# Patient Record
Sex: FEMALE | Race: OTHER | HISPANIC OR LATINO | URBAN - METROPOLITAN AREA
[De-identification: names, ages, dates, MRNs, and addresses within clinical notes are randomized per-mention and may not be internally consistent; named-entity substitution may affect disease eponyms.]

---

## 2021-12-13 ENCOUNTER — INPATIENT (INPATIENT)
Facility: HOSPITAL | Age: 86
LOS: 2 days | Discharge: HOME HEALTH | End: 2021-12-16
Attending: INTERNAL MEDICINE | Admitting: INTERNAL MEDICINE
Payer: MEDICARE

## 2021-12-13 ENCOUNTER — TRANSCRIPTION ENCOUNTER (OUTPATIENT)
Age: 86
End: 2021-12-13

## 2021-12-13 VITALS
OXYGEN SATURATION: 98 % | DIASTOLIC BLOOD PRESSURE: 78 MMHG | SYSTOLIC BLOOD PRESSURE: 112 MMHG | TEMPERATURE: 96 F | HEART RATE: 79 BPM | RESPIRATION RATE: 20 BRPM

## 2021-12-13 DIAGNOSIS — M54.9 DORSALGIA, UNSPECIFIED: ICD-10-CM

## 2021-12-13 LAB
ALBUMIN SERPL ELPH-MCNC: 4.5 G/DL — SIGNIFICANT CHANGE UP (ref 3.3–5)
ALP SERPL-CCNC: 84 U/L — SIGNIFICANT CHANGE UP (ref 40–120)
ALT FLD-CCNC: 10 U/L — SIGNIFICANT CHANGE UP (ref 4–33)
ANION GAP SERPL CALC-SCNC: 15 MMOL/L — HIGH (ref 7–14)
APTT BLD: 28.1 SEC — SIGNIFICANT CHANGE UP (ref 27–36.3)
AST SERPL-CCNC: 14 U/L — SIGNIFICANT CHANGE UP (ref 4–32)
BASOPHILS # BLD AUTO: 0.05 K/UL — SIGNIFICANT CHANGE UP (ref 0–0.2)
BASOPHILS NFR BLD AUTO: 0.6 % — SIGNIFICANT CHANGE UP (ref 0–2)
BILIRUB SERPL-MCNC: 0.4 MG/DL — SIGNIFICANT CHANGE UP (ref 0.2–1.2)
BLOOD GAS VENOUS COMPREHENSIVE RESULT: SIGNIFICANT CHANGE UP
BUN SERPL-MCNC: 20 MG/DL — SIGNIFICANT CHANGE UP (ref 7–23)
CALCIUM SERPL-MCNC: 10 MG/DL — SIGNIFICANT CHANGE UP (ref 8.4–10.5)
CHLORIDE SERPL-SCNC: 97 MMOL/L — LOW (ref 98–107)
CO2 SERPL-SCNC: 26 MMOL/L — SIGNIFICANT CHANGE UP (ref 22–31)
CREAT SERPL-MCNC: 1.14 MG/DL — SIGNIFICANT CHANGE UP (ref 0.5–1.3)
EOSINOPHIL # BLD AUTO: 0.06 K/UL — SIGNIFICANT CHANGE UP (ref 0–0.5)
EOSINOPHIL NFR BLD AUTO: 0.7 % — SIGNIFICANT CHANGE UP (ref 0–6)
GLUCOSE SERPL-MCNC: 132 MG/DL — HIGH (ref 70–99)
HCT VFR BLD CALC: 39.8 % — SIGNIFICANT CHANGE UP (ref 34.5–45)
HGB BLD-MCNC: 12.2 G/DL — SIGNIFICANT CHANGE UP (ref 11.5–15.5)
IANC: 6.69 K/UL — SIGNIFICANT CHANGE UP (ref 1.5–8.5)
IMM GRANULOCYTES NFR BLD AUTO: 0.5 % — SIGNIFICANT CHANGE UP (ref 0–1.5)
INR BLD: 1.14 RATIO — SIGNIFICANT CHANGE UP (ref 0.88–1.16)
LIDOCAIN IGE QN: 11 U/L — SIGNIFICANT CHANGE UP (ref 7–60)
LYMPHOCYTES # BLD AUTO: 1.14 K/UL — SIGNIFICANT CHANGE UP (ref 1–3.3)
LYMPHOCYTES # BLD AUTO: 13 % — SIGNIFICANT CHANGE UP (ref 13–44)
MCHC RBC-ENTMCNC: 29 PG — SIGNIFICANT CHANGE UP (ref 27–34)
MCHC RBC-ENTMCNC: 30.7 GM/DL — LOW (ref 32–36)
MCV RBC AUTO: 94.5 FL — SIGNIFICANT CHANGE UP (ref 80–100)
MONOCYTES # BLD AUTO: 0.76 K/UL — SIGNIFICANT CHANGE UP (ref 0–0.9)
MONOCYTES NFR BLD AUTO: 8.7 % — SIGNIFICANT CHANGE UP (ref 2–14)
NEUTROPHILS # BLD AUTO: 6.69 K/UL — SIGNIFICANT CHANGE UP (ref 1.8–7.4)
NEUTROPHILS NFR BLD AUTO: 76.5 % — SIGNIFICANT CHANGE UP (ref 43–77)
NRBC # BLD: 0 /100 WBCS — SIGNIFICANT CHANGE UP
NRBC # FLD: 0 K/UL — SIGNIFICANT CHANGE UP
PLATELET # BLD AUTO: 282 K/UL — SIGNIFICANT CHANGE UP (ref 150–400)
POTASSIUM SERPL-MCNC: 4.1 MMOL/L — SIGNIFICANT CHANGE UP (ref 3.5–5.3)
POTASSIUM SERPL-SCNC: 4.1 MMOL/L — SIGNIFICANT CHANGE UP (ref 3.5–5.3)
PROT SERPL-MCNC: 7.8 G/DL — SIGNIFICANT CHANGE UP (ref 6–8.3)
PROTHROM AB SERPL-ACNC: 13 SEC — SIGNIFICANT CHANGE UP (ref 10.6–13.6)
RBC # BLD: 4.21 M/UL — SIGNIFICANT CHANGE UP (ref 3.8–5.2)
RBC # FLD: 14.8 % — HIGH (ref 10.3–14.5)
SODIUM SERPL-SCNC: 138 MMOL/L — SIGNIFICANT CHANGE UP (ref 135–145)
WBC # BLD: 8.74 K/UL — SIGNIFICANT CHANGE UP (ref 3.8–10.5)
WBC # FLD AUTO: 8.74 K/UL — SIGNIFICANT CHANGE UP (ref 3.8–10.5)

## 2021-12-13 PROCEDURE — 99223 1ST HOSP IP/OBS HIGH 75: CPT | Mod: GC

## 2021-12-13 PROCEDURE — 74177 CT ABD & PELVIS W/CONTRAST: CPT | Mod: 26,MA

## 2021-12-13 PROCEDURE — 72131 CT LUMBAR SPINE W/O DYE: CPT | Mod: 26,MA

## 2021-12-13 PROCEDURE — 99285 EMERGENCY DEPT VISIT HI MDM: CPT

## 2021-12-13 PROCEDURE — 72128 CT CHEST SPINE W/O DYE: CPT | Mod: 26,MA

## 2021-12-13 PROCEDURE — 73630 X-RAY EXAM OF FOOT: CPT | Mod: 26,RT

## 2021-12-13 PROCEDURE — 70450 CT HEAD/BRAIN W/O DYE: CPT | Mod: 26,MA

## 2021-12-13 RX ORDER — MORPHINE SULFATE 50 MG/1
2 CAPSULE, EXTENDED RELEASE ORAL ONCE
Refills: 0 | Status: DISCONTINUED | OUTPATIENT
Start: 2021-12-13 | End: 2021-12-13

## 2021-12-13 RX ORDER — ACETAMINOPHEN 500 MG
1000 TABLET ORAL ONCE
Refills: 0 | Status: COMPLETED | OUTPATIENT
Start: 2021-12-13 | End: 2021-12-13

## 2021-12-13 RX ORDER — MORPHINE SULFATE 50 MG/1
4 CAPSULE, EXTENDED RELEASE ORAL ONCE
Refills: 0 | Status: DISCONTINUED | OUTPATIENT
Start: 2021-12-13 | End: 2021-12-13

## 2021-12-13 RX ORDER — LIDOCAINE 4 G/100G
1 CREAM TOPICAL ONCE
Refills: 0 | Status: COMPLETED | OUTPATIENT
Start: 2021-12-13 | End: 2021-12-13

## 2021-12-13 RX ORDER — SODIUM CHLORIDE 9 MG/ML
500 INJECTION INTRAMUSCULAR; INTRAVENOUS; SUBCUTANEOUS ONCE
Refills: 0 | Status: COMPLETED | OUTPATIENT
Start: 2021-12-13 | End: 2021-12-13

## 2021-12-13 RX ADMIN — Medication 1000 MILLIGRAM(S): at 17:15

## 2021-12-13 RX ADMIN — SODIUM CHLORIDE 500 MILLILITER(S): 9 INJECTION INTRAMUSCULAR; INTRAVENOUS; SUBCUTANEOUS at 16:47

## 2021-12-13 RX ADMIN — MORPHINE SULFATE 4 MILLIGRAM(S): 50 CAPSULE, EXTENDED RELEASE ORAL at 21:42

## 2021-12-13 RX ADMIN — LIDOCAINE 1 PATCH: 4 CREAM TOPICAL at 21:42

## 2021-12-13 RX ADMIN — Medication 1000 MILLIGRAM(S): at 18:58

## 2021-12-13 RX ADMIN — MORPHINE SULFATE 2 MILLIGRAM(S): 50 CAPSULE, EXTENDED RELEASE ORAL at 16:46

## 2021-12-13 RX ADMIN — Medication 400 MILLIGRAM(S): at 16:47

## 2021-12-13 RX ADMIN — MORPHINE SULFATE 2 MILLIGRAM(S): 50 CAPSULE, EXTENDED RELEASE ORAL at 18:58

## 2021-12-13 RX ADMIN — MORPHINE SULFATE 4 MILLIGRAM(S): 50 CAPSULE, EXTENDED RELEASE ORAL at 18:52

## 2021-12-13 NOTE — H&P ADULT - HISTORY OF PRESENT ILLNESS
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 History obtained primarily from ED documentation as pt has poor mental status in the setting of dementia. Patient started to have back pain early november, [place] [facility] [place]9 fracture on [place]/9, admitted to the hospital, had a kyphoplasty on 11/17 by dr. bryant and was dc to rehab then home. Over last few days pain acutely worsened to mid and lower back, constant, worse with movements, better when sitting up, still severe despite tylenol and oxycodone use. Last tylenol was approx 8am today. Patient took an oxy on saturday night, sunday grandaughter noted her to be dazed and a bit confused, vomited x 3, and has since not taken in much po. Patient and grandaughter deny any fever, cough, ha, neck pain, cp, sob, abd pain, diarrhea, dysuria, new le edema. Patient does note toe pain to r. third and fourth toe-no known trauma. Last fall >6m ago. No focal weakness, numbness, bowel/bladder retention or incontinence.   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 History obtained primarily from ED documentation as pt has poor mental status in the setting of dementia. Patient started to have back pain early november, went to [place] in [facility] [place] and was diagnosed with t9 fracture on 11/9, admitted to the hospital, had a kyphoplasty on 11/17 by dr. bryant and was dc to rehab then home. Over last few days pain acutely worsened to mid and lower back, constant, worse with movements, better when sitting up, still severe despite tylenol and oxycodone use. Last tylenol was approx 8am today. Patient took an oxy on saturday night, sunday grandaughter noted her to be dazed and a bit confused, vomited x 3, and has since not taken in much po. Patient and grandaughter deny any fever, cough, ha, neck pain, cp, sob, abd pain, diarrhea, dysuria, new le edema. Patient does note toe pain to r. third and fourth toe-no known trauma. Last fall >6m ago. No focal weakness, numbness, bowel/bladder retention or incontinence.

## 2021-12-13 NOTE — H&P ADULT - PROBLEM SELECTOR PLAN 1
-pt presenting s/p several weeks of back trauma seen at outside hospital  -pt's imaging not suggestive of acute pathology on CT scan  -pt has hx of neuropathy and DM, thus back pain could be deconditioning vs neuropathic pain   -pt appears to not tolerate oxycodone well (dizziness with vomiting), can attempt to treat pain with tylenol and NSAID multimodal  -PT eval in the AM -pt presenting s/p several weeks of back trauma seen at outside hospital  -pt's imaging not suggestive of acute pathology on CT scan  -pt has hx of neuropathy and DM, thus back pain could be deconditioning vs neuropathic pain   -pt appears to not tolerate oxycodone well (dizziness with vomiting), can attempt to treat pain with Tylenol and NSAID multimodal  -PT eval in the AM  -MRI thoracic and lumbar ordered

## 2021-12-13 NOTE — H&P ADULT - ATTENDING COMMENTS
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 s/p recent T9 kyphoplasty at [facility]. pan spine CT negative for acute process, pt denies numbness, symmetric strength throughout, but will order MRI given worsening pain following recent spine intervention. Trial toradol/ tylenol, PT

## 2021-12-13 NOTE — ED PROVIDER NOTE - CLINICAL SUMMARY MEDICAL DECISION MAKING FREE TEXT BOX
86f with ext medical history, t9 fx dx on 11/9 s/p kyphoplasty on 11/17, now with acute worsening of back pain, episode of confusion and vomiting x 3 yesterday. Patient nontoxic appearing, ao3, vs ok, plan for labs, ua/cx, ct t-l spine, abd pelvis, brain, xr toes,-eval for elec disturbances, organ dysfunction, new fractures, infections, mass/stroke, and reass-patient currently neuro intact, pain control-discussed options with patient-will trial iv tylenol and small dose of morphine given pain severe at this time, and reass. Recommended admission to patient and family pending initial ed w/u.

## 2021-12-13 NOTE — H&P ADULT - NSHPPHYSICALEXAM_GEN_ALL_CORE
Gen: awake, alert, WD, WN, NAD, pt disoriented presumably due to dementia  Head:  NC, AT  ENT:  PERRL, moist mucous membranes, OP-clear  Neck: supple, nontender  CV:  S1 S2, RRR, no M/G/R  Pulm:  CTAB, good air movement  Abd:  Soft, nontender, nondistended, +BS, no rebound/guarding  Back:   to palpation in thorcic and lumbar areas  Ext:  warm, well perfused, moving all extremities spontaneously, 1+ edema in LE, distal pulses intact  Skin: intact  Neuro:  A&Ox2, no focal neuro deficit, speech clear

## 2021-12-13 NOTE — ED PROVIDER NOTE - PROGRESS NOTE DETAILS
patient reassessed after return from ct scan-she still is having severe pain, will redose meds and judiciously increase dose of morphine to 4mg. patient and grandaughter agreeable. all results d/w patient and [facility], admit for further management/pain control.

## 2021-12-13 NOTE — ED ADULT NURSE NOTE - OBJECTIVE STATEMENT
Pt received to intake 13 c/o back pain. Pt had a spinal fracture and had spinal surgery on Nov 17 2021. Pt is Aox4, ambulatory with cane at baseline. Currently Aox4, hard of hearing. Accompanied by granddaughter. Appears uncomfortable when moving in bed. 20g IV placed to L ac. Labs drawn & sent. Medicated for pain. Awaiting UA and imaging. Hx: HTN, HLD, DM, MI stents (unknown how many), osteoporosis. Denies any falls or injury that caused fx. Granddaughter states that MDs believe the fracture may have been caused by osteoporosis.

## 2021-12-13 NOTE — H&P ADULT - ASSESSMENT
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 Imaging is not suggestive of any acute mechanical processes that could directly be the cause of this back pain. Most likely due to deconditioning from previous trauma.  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 Imaging is not suggestive of any acute mechanical processes that could directly be the cause of this back pain. Most likely due to deconditioning from previous trauma and extended bed rest. Will have PT eval the pt.

## 2021-12-13 NOTE — H&P ADULT - PROBLEM SELECTOR PLAN 3
-low dose ISS before meals and at night  -A1C in the AM   -hold at home metformin and SGLT2 inhibitor -low dose ISS before meals and at night  -A1C in the AM   -hold at home metformin and SGLT2 inhibitor  -repeat BMP in the AM to check anion gap

## 2021-12-13 NOTE — ED PROVIDER NOTE - OBJECTIVE STATEMENT
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 History obtained primarily from gemater at bedside with patient supplementing. Patient started to have back pain early november, [place] [facility] [place] diagnosed with t9 fracture on 11/9, admitted to the hospital, had a kyphoplasty on 11/17 by dr. bryant and was dc to rehab then home. Over last few days pain acutely worsened to mid and lower back, constant, worse with movements, better when sitting up, still severe despite tylenol and oxycodone use. Last tylenol was approx 8am today. Patient took an oxy on saturday night, sunday grandaughter noted her to be dazed and a bit confused, vomited x 3, and has since not taken in much po. Patient and grandaughter deny any fever, cough, ha, neck pain, cp, sob, abd pain, diarrhea, dysuria, new le edema. Patient does note toe pain to r. third and fourth toe-no known trauma. Last fall >6m ago. No focal weakness, numbness, bowel/bladder retention or incontinence.

## 2021-12-13 NOTE — ED PROVIDER NOTE - NS ED ROS FT
ROS:  GENERAL: No fever, no chills, +dec appetite  EYES: no change in vision  HEENT: no trouble swallowing, no trouble speaking  CARDIAC: no chest pain  PULMONARY: no cough, no shortness of breath  GI: no abdominal pain, + vomiting now resolved, no diarrhea, no constipation  : No dysuria, no frequency, no change in appearance, or odor of urine  SKIN: no rashes  NEURO: no headache, no weakness  MSK: +back pain, toe pain

## 2021-12-13 NOTE — ED ADULT TRIAGE NOTE - CHIEF COMPLAINT QUOTE
Pt brought in by family member for back pain. Pt has known fracture of spine. Pt has been having physical therapy but pain is worsening.

## 2021-12-13 NOTE — H&P ADULT - PROBLEM SELECTOR PLAN 7
-cont gabapentin with increased dose of 200-300 TID to see if this improves back pain as well -cont gabapentin at home dose

## 2021-12-13 NOTE — H&P ADULT - NSHPLABSRESULTS_GEN_ALL_CORE
.  LABS:                         12.2   8.74  )-----------( 282      ( 13 Dec 2021 16:50 )             39.8     12-13    138  |  97<L>  |  20  ----------------------------<  132<H>  4.1   |  26  |  1.14    Ca    10.0      13 Dec 2021 16:50    TPro  7.8  /  Alb  4.5  /  TBili  0.4  /  DBili  x   /  AST  14  /  ALT  10  /  AlkPhos  84  12-13    PT/INR - ( 13 Dec 2021 16:50 )   PT: 13.0 sec;   INR: 1.14 ratio         PTT - ( 13 Dec 2021 16:50 )  PTT:28.1 sec  Urinalysis Basic - ( 13 Dec 2021 22:25 )    Color: Light Yellow / Appearance: Clear / SG: >1.050 / pH: x  Gluc: x / Ketone: Trace  / Bili: Negative / Urobili: <2 mg/dL   Blood: x / Protein: Trace / Nitrite: Negative   Leuk Esterase: Large / RBC: 1 /HPF / WBC 13 /HPF   Sq Epi: x / Non Sq Epi: 5 /HPF / Bacteria: Few            RADIOLOGY, EKG & ADDITIONAL TESTS: Reviewed. .  LABS:                         12.2   8.74  )-----------( 282      ( 13 Dec 2021 16:50 )             39.8     12-13    138  |  97<L>  |  20  ----------------------------<  132<H>  4.1   |  26  |  1.14    Ca    10.0      13 Dec 2021 16:50    TPro  7.8  /  Alb  4.5  /  TBili  0.4  /  DBili  x   /  AST  14  /  ALT  10  /  AlkPhos  84  12-13    PT/INR - ( 13 Dec 2021 16:50 )   PT: 13.0 sec;   INR: 1.14 ratio         PTT - ( 13 Dec 2021 16:50 )  PTT:28.1 sec  Urinalysis Basic - ( 13 Dec 2021 22:25 )    < from: CT Lumbar Spine No Cont (12.13.21 @ 18:45) >    IMPRESSION:    No acute fracture or traumatic subluxation.    Methylmethacrylate is noted within the T9 vertebral body. T9 vertebral   body is moderately compressed. No retropulsed bone.    Degenerative changes in the lumbar region as detailed in the body the   report      --- Endof Report ---    < end of copied text >        Color: Light Yellow / Appearance: Clear / SG: >1.050 / pH: x  Gluc: x / Ketone: Trace  / Bili: Negative / Urobili: <2 mg/dL   Blood: x / Protein: Trace / Nitrite: Negative   Leuk Esterase: Large / RBC: 1 /HPF / WBC 13 /HPF   Sq Epi: x / Non Sq Epi: 5 /HPF / Bacteria: Few            RADIOLOGY, EKG & ADDITIONAL TESTS: Reviewed.

## 2021-12-13 NOTE — H&P ADULT - PROBLEM/PLAN-8
Consent: Written consent was obtained and risks were reviewed including but not limited to scarring, infection, bleeding, scabbing, incomplete removal, nerve damage and allergy to anesthesia. Dressing: bandage Hemostasis: Drysol Lab: [id number] [facility] [address] Depth Of Biopsy: dermis Size Of Lesion In Cm: 0.8 Anesthesia Volume In Cc (Will Not Render If 0): 1 Wound Care: Polysporin ointment Anesthesia Type: 1% lidocaine with epinephrine and a 1:10 solution of 8.4% sodium bicarbonate Bill 11986 For Specimen Handling/Conveyance To Laboratory?: no Notification Instructions: Patient will be notified of biopsy results. However, patient instructed to call the office if not contacted within 2 weeks. Was A Bandage Applied: Yes Electrodesiccation Text: The wound bed was treated with electrodesiccation after the biopsy was performed. Curettage Text: The wound bed was treated with curettage after the biopsy was performed. Cryotherapy Text: The wound bed was treated with cryotherapy after the biopsy was performed. Type Of Destruction Used: Curettage X Size Of Lesion In Cm: 0 Silver Nitrate Text: The wound bed was treated with silver nitrate after the biopsy was performed. Billing Type: [employer] Detail Level: Detailed Post-Care Instructions: I reviewed with the patient in detail post-care instructions. Patient is to keep the biopsy site dry overnight, and then apply bacitracin twice daily until healed. Patient may apply hydrogen peroxide soaks to remove any crusting. Electrodesiccation And Curettage Text: The wound bed was treated with electrodesiccation and curettage after the biopsy was performed. Biopsy Type: H and E Body Location Override (Optional - Billing Will Still Be Based On Selected Body Map Location If Applicable): Right Nasal Crease Biopsy Method: Personna blade DISPLAY PLAN FREE TEXT

## 2021-12-14 ENCOUNTER — TRANSCRIPTION ENCOUNTER (OUTPATIENT)
Age: 86
End: 2021-12-14

## 2021-12-14 DIAGNOSIS — I25.10 ATHEROSCLEROTIC HEART DISEASE OF NATIVE CORONARY ARTERY WITHOUT ANGINA PECTORIS: ICD-10-CM

## 2021-12-14 DIAGNOSIS — M06.9 RHEUMATOID ARTHRITIS, UNSPECIFIED: ICD-10-CM

## 2021-12-14 DIAGNOSIS — E03.9 HYPOTHYROIDISM, UNSPECIFIED: ICD-10-CM

## 2021-12-14 DIAGNOSIS — M19.90 UNSPECIFIED OSTEOARTHRITIS, UNSPECIFIED SITE: ICD-10-CM

## 2021-12-14 DIAGNOSIS — Z29.9 ENCOUNTER FOR PROPHYLACTIC MEASURES, UNSPECIFIED: ICD-10-CM

## 2021-12-14 DIAGNOSIS — I10 ESSENTIAL (PRIMARY) HYPERTENSION: ICD-10-CM

## 2021-12-14 DIAGNOSIS — E11.9 TYPE 2 DIABETES MELLITUS WITHOUT COMPLICATIONS: ICD-10-CM

## 2021-12-14 DIAGNOSIS — G62.9 POLYNEUROPATHY, UNSPECIFIED: ICD-10-CM

## 2021-12-14 DIAGNOSIS — M54.9 DORSALGIA, UNSPECIFIED: ICD-10-CM

## 2021-12-14 LAB
A1C WITH ESTIMATED AVERAGE GLUCOSE RESULT: 7.8 % — HIGH (ref 4–5.6)
ESTIMATED AVERAGE GLUCOSE: 177 — SIGNIFICANT CHANGE UP
GLUCOSE BLDC GLUCOMTR-MCNC: 147 MG/DL — HIGH (ref 70–99)
GLUCOSE BLDC GLUCOMTR-MCNC: 221 MG/DL — HIGH (ref 70–99)
GLUCOSE BLDC GLUCOMTR-MCNC: 317 MG/DL — HIGH (ref 70–99)

## 2021-12-14 PROCEDURE — 99232 SBSQ HOSP IP/OBS MODERATE 35: CPT | Mod: GC

## 2021-12-14 PROCEDURE — 93970 EXTREMITY STUDY: CPT | Mod: 26

## 2021-12-14 RX ORDER — KETOROLAC TROMETHAMINE 30 MG/ML
15 SYRINGE (ML) INJECTION ONCE
Refills: 0 | Status: DISCONTINUED | OUTPATIENT
Start: 2021-12-14 | End: 2021-12-14

## 2021-12-14 RX ORDER — GLUCAGON INJECTION, SOLUTION 0.5 MG/.1ML
1 INJECTION, SOLUTION SUBCUTANEOUS ONCE
Refills: 0 | Status: DISCONTINUED | OUTPATIENT
Start: 2021-12-14 | End: 2021-12-16

## 2021-12-14 RX ORDER — LOSARTAN POTASSIUM 100 MG/1
1 TABLET, FILM COATED ORAL
Qty: 0 | Refills: 0 | DISCHARGE

## 2021-12-14 RX ORDER — DEXTROSE 50 % IN WATER 50 %
25 SYRINGE (ML) INTRAVENOUS ONCE
Refills: 0 | Status: DISCONTINUED | OUTPATIENT
Start: 2021-12-14 | End: 2021-12-16

## 2021-12-14 RX ORDER — FUROSEMIDE 40 MG
1 TABLET ORAL
Qty: 0 | Refills: 0 | DISCHARGE

## 2021-12-14 RX ORDER — ACETAMINOPHEN 500 MG
650 TABLET ORAL EVERY 6 HOURS
Refills: 0 | Status: DISCONTINUED | OUTPATIENT
Start: 2021-12-14 | End: 2021-12-14

## 2021-12-14 RX ORDER — DEXTROSE 50 % IN WATER 50 %
15 SYRINGE (ML) INTRAVENOUS ONCE
Refills: 0 | Status: DISCONTINUED | OUTPATIENT
Start: 2021-12-14 | End: 2021-12-16

## 2021-12-14 RX ORDER — SODIUM CHLORIDE 9 MG/ML
1000 INJECTION, SOLUTION INTRAVENOUS
Refills: 0 | Status: DISCONTINUED | OUTPATIENT
Start: 2021-12-14 | End: 2021-12-16

## 2021-12-14 RX ORDER — DEXTROSE 50 % IN WATER 50 %
12.5 SYRINGE (ML) INTRAVENOUS ONCE
Refills: 0 | Status: DISCONTINUED | OUTPATIENT
Start: 2021-12-14 | End: 2021-12-16

## 2021-12-14 RX ORDER — METOPROLOL TARTRATE 50 MG
12.5 TABLET ORAL
Qty: 0 | Refills: 0 | DISCHARGE

## 2021-12-14 RX ORDER — CLOPIDOGREL BISULFATE 75 MG/1
1 TABLET, FILM COATED ORAL
Qty: 0 | Refills: 0 | DISCHARGE

## 2021-12-14 RX ORDER — LANOLIN ALCOHOL/MO/W.PET/CERES
3 CREAM (GRAM) TOPICAL AT BEDTIME
Refills: 0 | Status: DISCONTINUED | OUTPATIENT
Start: 2021-12-14 | End: 2021-12-16

## 2021-12-14 RX ORDER — CLOPIDOGREL BISULFATE 75 MG/1
75 TABLET, FILM COATED ORAL DAILY
Refills: 0 | Status: DISCONTINUED | OUTPATIENT
Start: 2021-12-14 | End: 2021-12-16

## 2021-12-14 RX ORDER — FUROSEMIDE 40 MG
40 TABLET ORAL DAILY
Refills: 0 | Status: DISCONTINUED | OUTPATIENT
Start: 2021-12-14 | End: 2021-12-14

## 2021-12-14 RX ORDER — RIVAROXABAN 15 MG-20MG
20 KIT ORAL DAILY
Refills: 0 | Status: DISCONTINUED | OUTPATIENT
Start: 2021-12-14 | End: 2021-12-16

## 2021-12-14 RX ORDER — OXYCODONE HYDROCHLORIDE 5 MG/1
5 TABLET ORAL
Refills: 0 | Status: DISCONTINUED | OUTPATIENT
Start: 2021-12-14 | End: 2021-12-16

## 2021-12-14 RX ORDER — ONDANSETRON 8 MG/1
4 TABLET, FILM COATED ORAL EVERY 8 HOURS
Refills: 0 | Status: DISCONTINUED | OUTPATIENT
Start: 2021-12-14 | End: 2021-12-16

## 2021-12-14 RX ORDER — OXYCODONE HYDROCHLORIDE 5 MG/1
5 TABLET ORAL ONCE
Refills: 0 | Status: DISCONTINUED | OUTPATIENT
Start: 2021-12-14 | End: 2021-12-14

## 2021-12-14 RX ORDER — FOLIC ACID 0.8 MG
1 TABLET ORAL
Qty: 0 | Refills: 0 | DISCHARGE

## 2021-12-14 RX ORDER — INSULIN LISPRO 100/ML
VIAL (ML) SUBCUTANEOUS AT BEDTIME
Refills: 0 | Status: DISCONTINUED | OUTPATIENT
Start: 2021-12-14 | End: 2021-12-16

## 2021-12-14 RX ORDER — CHOLECALCIFEROL (VITAMIN D3) 125 MCG
1 CAPSULE ORAL
Qty: 0 | Refills: 0 | DISCHARGE

## 2021-12-14 RX ORDER — INSULIN LISPRO 100/ML
VIAL (ML) SUBCUTANEOUS
Refills: 0 | Status: DISCONTINUED | OUTPATIENT
Start: 2021-12-14 | End: 2021-12-16

## 2021-12-14 RX ORDER — ACETAMINOPHEN 500 MG
650 TABLET ORAL EVERY 6 HOURS
Refills: 0 | Status: DISCONTINUED | OUTPATIENT
Start: 2021-12-14 | End: 2021-12-16

## 2021-12-14 RX ORDER — LEVOTHYROXINE SODIUM 125 MCG
75 TABLET ORAL DAILY
Refills: 0 | Status: DISCONTINUED | OUTPATIENT
Start: 2021-12-14 | End: 2021-12-16

## 2021-12-14 RX ORDER — DAPAGLIFLOZIN 10 MG/1
1 TABLET, FILM COATED ORAL
Qty: 0 | Refills: 0 | DISCHARGE

## 2021-12-14 RX ORDER — ATORVASTATIN CALCIUM 80 MG/1
40 TABLET, FILM COATED ORAL AT BEDTIME
Refills: 0 | Status: DISCONTINUED | OUTPATIENT
Start: 2021-12-14 | End: 2021-12-16

## 2021-12-14 RX ORDER — METFORMIN HYDROCHLORIDE 850 MG/1
1 TABLET ORAL
Qty: 0 | Refills: 0 | DISCHARGE

## 2021-12-14 RX ORDER — LOSARTAN POTASSIUM 100 MG/1
100 TABLET, FILM COATED ORAL DAILY
Refills: 0 | Status: DISCONTINUED | OUTPATIENT
Start: 2021-12-14 | End: 2021-12-16

## 2021-12-14 RX ORDER — LEVOTHYROXINE SODIUM 125 MCG
1 TABLET ORAL
Qty: 0 | Refills: 0 | DISCHARGE

## 2021-12-14 RX ORDER — ATORVASTATIN CALCIUM 80 MG/1
1 TABLET, FILM COATED ORAL
Qty: 0 | Refills: 0 | DISCHARGE

## 2021-12-14 RX ORDER — GABAPENTIN 400 MG/1
100 CAPSULE ORAL THREE TIMES A DAY
Refills: 0 | Status: DISCONTINUED | OUTPATIENT
Start: 2021-12-14 | End: 2021-12-16

## 2021-12-14 RX ORDER — DIPHENHYDRAMINE HCL 50 MG
25 CAPSULE ORAL ONCE
Refills: 0 | Status: COMPLETED | OUTPATIENT
Start: 2021-12-14 | End: 2021-12-14

## 2021-12-14 RX ORDER — METOPROLOL TARTRATE 50 MG
12.5 TABLET ORAL DAILY
Refills: 0 | Status: DISCONTINUED | OUTPATIENT
Start: 2021-12-14 | End: 2021-12-16

## 2021-12-14 RX ORDER — RIVAROXABAN 15 MG-20MG
1 KIT ORAL
Qty: 0 | Refills: 0 | DISCHARGE

## 2021-12-14 RX ORDER — INSULIN LISPRO 100/ML
4 VIAL (ML) SUBCUTANEOUS
Qty: 0 | Refills: 0 | DISCHARGE

## 2021-12-14 RX ORDER — GABAPENTIN 400 MG/1
1 CAPSULE ORAL
Qty: 0 | Refills: 0 | DISCHARGE

## 2021-12-14 RX ORDER — METHOTREXATE 2.5 MG/1
1 TABLET ORAL
Qty: 0 | Refills: 0 | DISCHARGE

## 2021-12-14 RX ORDER — LIDOCAINE 4 G/100G
1 CREAM TOPICAL DAILY
Refills: 0 | Status: DISCONTINUED | OUTPATIENT
Start: 2021-12-14 | End: 2021-12-16

## 2021-12-14 RX ORDER — PANTOPRAZOLE SODIUM 20 MG/1
40 TABLET, DELAYED RELEASE ORAL
Refills: 0 | Status: DISCONTINUED | OUTPATIENT
Start: 2021-12-14 | End: 2021-12-16

## 2021-12-14 RX ORDER — PANTOPRAZOLE SODIUM 20 MG/1
1 TABLET, DELAYED RELEASE ORAL
Qty: 0 | Refills: 0 | DISCHARGE

## 2021-12-14 RX ADMIN — OXYCODONE HYDROCHLORIDE 5 MILLIGRAM(S): 5 TABLET ORAL at 17:25

## 2021-12-14 RX ADMIN — LIDOCAINE 1 PATCH: 4 CREAM TOPICAL at 06:34

## 2021-12-14 RX ADMIN — GABAPENTIN 100 MILLIGRAM(S): 400 CAPSULE ORAL at 05:32

## 2021-12-14 RX ADMIN — Medication 25 MILLIGRAM(S): at 00:06

## 2021-12-14 RX ADMIN — CLOPIDOGREL BISULFATE 75 MILLIGRAM(S): 75 TABLET, FILM COATED ORAL at 11:23

## 2021-12-14 RX ADMIN — LIDOCAINE 1 PATCH: 4 CREAM TOPICAL at 09:22

## 2021-12-14 RX ADMIN — GABAPENTIN 100 MILLIGRAM(S): 400 CAPSULE ORAL at 21:36

## 2021-12-14 RX ADMIN — ATORVASTATIN CALCIUM 40 MILLIGRAM(S): 80 TABLET, FILM COATED ORAL at 21:36

## 2021-12-14 RX ADMIN — GABAPENTIN 100 MILLIGRAM(S): 400 CAPSULE ORAL at 13:08

## 2021-12-14 RX ADMIN — RIVAROXABAN 20 MILLIGRAM(S): KIT at 14:35

## 2021-12-14 RX ADMIN — LOSARTAN POTASSIUM 100 MILLIGRAM(S): 100 TABLET, FILM COATED ORAL at 05:32

## 2021-12-14 RX ADMIN — Medication 12.5 MILLIGRAM(S): at 05:31

## 2021-12-14 RX ADMIN — Medication 40 MILLIGRAM(S): at 05:34

## 2021-12-14 RX ADMIN — PANTOPRAZOLE SODIUM 40 MILLIGRAM(S): 20 TABLET, DELAYED RELEASE ORAL at 06:03

## 2021-12-14 RX ADMIN — Medication 75 MICROGRAM(S): at 05:31

## 2021-12-14 RX ADMIN — Medication 15 MILLIGRAM(S): at 06:20

## 2021-12-14 RX ADMIN — Medication 5 MILLIGRAM(S): at 05:32

## 2021-12-14 RX ADMIN — Medication 15 MILLIGRAM(S): at 06:17

## 2021-12-14 RX ADMIN — OXYCODONE HYDROCHLORIDE 5 MILLIGRAM(S): 5 TABLET ORAL at 16:55

## 2021-12-14 RX ADMIN — Medication 4: at 17:46

## 2021-12-14 RX ADMIN — LIDOCAINE 1 PATCH: 4 CREAM TOPICAL at 15:38

## 2021-12-14 RX ADMIN — LIDOCAINE 1 PATCH: 4 CREAM TOPICAL at 18:22

## 2021-12-14 RX ADMIN — Medication 650 MILLIGRAM(S): at 17:50

## 2021-12-14 NOTE — DISCHARGE NOTE NURSING/CASE MANAGEMENT/SOCIAL WORK - NSDCCRNAME_GEN_ALL_CORE_FT
[facility] [phone]   HA 12/7 from [facility] [phone]

## 2021-12-14 NOTE — PHYSICAL THERAPY INITIAL EVALUATION ADULT - DISCHARGE DISPOSITION, PT EVAL
pt may benefit from outpatient PT and rolling walker (pt states she owns walker); if unable to get transportation to outpatient services may benefit from home with home PT

## 2021-12-14 NOTE — PHYSICAL THERAPY INITIAL EVALUATION ADULT - PERTINENT HX OF CURRENT PROBLEM, REHAB EVAL
87 y/o female with h/o HTN, DM, HLD, RA, OA, hypothyroidism, neuropathy, left LE dvt on xarelto, presented to the ED with back pain. s/p recent T9 kyphoplasty at [facility]. CT negative for acute process

## 2021-12-14 NOTE — PHYSICAL THERAPY INITIAL EVALUATION ADULT - LEVEL OF INDEPENDENCE: GAIT, REHAB EVAL
contact guard/occasional minimal assist while pt using straight cane;  close supervision/contact guard with rolling walker

## 2021-12-14 NOTE — PATIENT PROFILE ADULT - FALL HARM RISK - HARM RISK INTERVENTIONS

## 2021-12-14 NOTE — PROGRESS NOTE ADULT - PROBLEM SELECTOR PLAN 5
-nsaid therapy as above Patient on chronic prednisone 5mg and methotrexate once a week (on Wednesday)  - Continue home meds

## 2021-12-14 NOTE — PROGRESS NOTE ADULT - SUBJECTIVE AND OBJECTIVE BOX
PROGRESS NOTE:   Authored by Jairo Villalobos MD  Pager: [facility] [phone]; [facility] [phone]    Patient is a 86y old  Female who presents with a chief complaint of Back pain (13 Dec 2021 21:15)      SUBJECTIVE / OVERNIGHT EVENTS:    ADDITIONAL REVIEW OF SYSTEMS:    MEDICATIONS  (STANDING):  clopidogrel Tablet 75 milliGRAM(s) Oral daily  furosemide    Tablet 40 milliGRAM(s) Oral daily  gabapentin 100 milliGRAM(s) Oral three times a day  levothyroxine 75 MICROGram(s) Oral daily  losartan 100 milliGRAM(s) Oral daily  metoprolol tartrate 12.5 milliGRAM(s) Oral daily  pantoprazole    Tablet 40 milliGRAM(s) Oral before breakfast  predniSONE   Tablet 5 milliGRAM(s) Oral daily  rivaroxaban 20 milliGRAM(s) Oral daily    MEDICATIONS  (PRN):  acetaminophen     Tablet .. 650 milliGRAM(s) Oral every 6 hours PRN Temp greater or equal to 38C (100.4F), Mild Pain (1 - 3)  aluminum hydroxide/magnesium hydroxide/simethicone Suspension 30 milliLiter(s) Oral every 4 hours PRN Dyspepsia  melatonin 3 milliGRAM(s) Oral at bedtime PRN Insomnia  ondansetron Injectable 4 milliGRAM(s) IV Push every 8 hours PRN Nausea and/or Vomiting      CAPILLARY BLOOD GLUCOSE      POCT Blood Glucose.: 96 mg/dL (14 Dec 2021 04:29)  POCT Blood Glucose.: 120 mg/dL (13 Dec 2021 16:58)    I&O's Summary      PHYSICAL EXAM:  Vital Signs Last 24 Hrs  T(C): 36.3 (14 Dec 2021 05:20), Max: 37.1 (13 Dec 2021 16:30)  T(F): 97.4 (14 Dec 2021 05:20), Max: 98.7 (13 Dec 2021 16:30)  HR: 65 (14 Dec 2021 05:20) (65 - 80)  BP: 169/77 (14 Dec 2021 05:20) (112/78 - 172/74)  BP(mean): --  RR: 18 (14 Dec 2021 05:20) (16 - 20)  SpO2: 99% (14 Dec 2021 05:20) (96% - 100%)    GENERAL: No acute distress, well-developed  HEAD:  Atraumatic, Normocephalic  EYES: EOMI, PERRLA, conjunctiva and sclera clear  NECK: Supple, no lymphadenopathy, no JVD  CHEST/LUNG: CTAB; No wheezes, rales, or rhonchi  HEART: Regular rate and rhythm; No murmurs, rubs, or gallops  ABDOMEN: Soft, non-tender, non-distended; normal bowel sounds, no organomegaly  EXTREMITIES:  2+ peripheral pulses b/l, No clubbing, cyanosis, or edema  NEUROLOGY: A&O x 3, no focal deficits  SKIN: No rashes or lesions    LABS:                        12.2   8.74  )-----------( 282      ( 13 Dec 2021 16:50 )             39.8     12-13    138  |  97<L>  |  20  ----------------------------<  132<H>  4.1   |  26  |  1.14    Ca    10.0      13 Dec 2021 16:50    TPro  7.8  /  Alb  4.5  /  TBili  0.4  /  DBili  x   /  AST  14  /  ALT  10  /  AlkPhos  84  12-13    PT/INR - ( 13 Dec 2021 16:50 )   PT: 13.0 sec;   INR: 1.14 ratio         PTT - ( 13 Dec 2021 16:50 )  PTT:28.1 sec      Urinalysis Basic - ( 13 Dec 2021 22:25 )    Color: Light Yellow / Appearance: Clear / SG: >1.050 / pH: x  Gluc: x / Ketone: Trace  / Bili: Negative / Urobili: <2 mg/dL   Blood: x / Protein: Trace / Nitrite: Negative   Leuk Esterase: Large / RBC: 1 /HPF / WBC 13 /HPF   Sq Epi: x / Non Sq Epi: 5 /HPF / Bacteria: Few          RADIOLOGY & ADDITIONAL TESTS:  Results Reviewed:   Imaging Personally Reviewed:  Electrocardiogram Personally Reviewed:    COORDINATION OF CARE:  Care Discussed with Consultants/Other Providers [Y/N]:  Prior or Outpatient Records Reviewed [Y/N]:   PROGRESS NOTE:   Authored by Jairo Villalobos MD  Pager: [facility] [phone]; [facility] [phone]    Patient is a 86y old  Female who presents with a chief complaint of Back pain (13 Dec 2021 21:15)      SUBJECTIVE / OVERNIGHT EVENTS:  No acute events overnight. This morning, she reports continued back pain. Denies numbness, weakness, urinary/fecal incontinence.  Otherwise, denies fevers, chills, chest pain, palpitations, N/V/D, new urinary symptoms.    ADDITIONAL REVIEW OF SYSTEMS:  CONSTITUTIONAL: No weakness, fevers or chills  RESPIRATORY: No cough, wheezing, hemoptysis; No shortness of breath  CARDIOVASCULAR: No chest pain or palpitations  GASTROINTESTINAL: No abdominal or epigastric pain. No nausea, vomiting, or hematemesis; No diarrhea or constipation. No melena or hematochezia.  GENITOURINARY: No dysuria, frequency or hematuria  NEUROLOGICAL: No numbness or weakness  All other review of systems is negative unless indicated above.    MEDICATIONS  (STANDING):  clopidogrel Tablet 75 milliGRAM(s) Oral daily  furosemide    Tablet 40 milliGRAM(s) Oral daily  gabapentin 100 milliGRAM(s) Oral three times a day  levothyroxine 75 MICROGram(s) Oral daily  losartan 100 milliGRAM(s) Oral daily  metoprolol tartrate 12.5 milliGRAM(s) Oral daily  pantoprazole    Tablet 40 milliGRAM(s) Oral before breakfast  predniSONE   Tablet 5 milliGRAM(s) Oral daily  rivaroxaban 20 milliGRAM(s) Oral daily    MEDICATIONS  (PRN):  acetaminophen     Tablet .. 650 milliGRAM(s) Oral every 6 hours PRN Temp greater or equal to 38C (100.4F), Mild Pain (1 - 3)  aluminum hydroxide/magnesium hydroxide/simethicone Suspension 30 milliLiter(s) Oral every 4 hours PRN Dyspepsia  melatonin 3 milliGRAM(s) Oral at bedtime PRN Insomnia  ondansetron Injectable 4 milliGRAM(s) IV Push every 8 hours PRN Nausea and/or Vomiting      CAPILLARY BLOOD GLUCOSE      POCT Blood Glucose.: 96 mg/dL (14 Dec 2021 04:29)  POCT Blood Glucose.: 120 mg/dL (13 Dec 2021 16:58)    I&O's Summary      PHYSICAL EXAM:  Vital Signs Last 24 Hrs  T(C): 36.3 (14 Dec 2021 05:20), Max: 37.1 (13 Dec 2021 16:30)  T(F): 97.4 (14 Dec 2021 05:20), Max: 98.7 (13 Dec 2021 16:30)  HR: 65 (14 Dec 2021 05:20) (65 - 80)  BP: 169/77 (14 Dec 2021 05:20) (112/78 - 172/74)  BP(mean): --  RR: 18 (14 Dec 2021 05:20) (16 - 20)  SpO2: 99% (14 Dec 2021 05:20) (96% - 100%)    GENERAL: No acute distress, well-developed  HEAD:  Atraumatic, Normocephalic  EYES: EOMI, PERRLA, conjunctiva and sclera clear  CHEST/LUNG: CTAB; No wheezes, rales, or rhonchi  HEART: Regular rate and rhythm; No murmurs, rubs, or gallops  ABDOMEN: Soft, non-tender, non-distended; normal bowel sounds  EXTREMITIES:  2+ peripheral pulses b/l, No clubbing, cyanosis, or edema  NEUROLOGY: A&O x 3, no focal deficits    LABS:                        12.2   8.74  )-----------( 282      ( 13 Dec 2021 16:50 )             39.8     12-13    138  |  97<L>  |  20  ----------------------------<  132<H>  4.1   |  26  |  1.14    Ca    10.0      13 Dec 2021 16:50    TPro  7.8  /  Alb  4.5  /  TBili  0.4  /  DBili  x   /  AST  14  /  ALT  10  /  AlkPhos  84  12-13    PT/INR - ( 13 Dec 2021 16:50 )   PT: 13.0 sec;   INR: 1.14 ratio         PTT - ( 13 Dec 2021 16:50 )  PTT:28.1 sec      Urinalysis Basic - ( 13 Dec 2021 22:25 )    Color: Light Yellow / Appearance: Clear / SG: >1.050 / pH: x  Gluc: x / Ketone: Trace  / Bili: Negative / Urobili: <2 mg/dL   Blood: x / Protein: Trace / Nitrite: Negative   Leuk Esterase: Large / RBC: 1 /HPF / WBC 13 /HPF   Sq Epi: x / Non Sq Epi: 5 /HPF / Bacteria: Few          RADIOLOGY & ADDITIONAL TESTS:    EXAM:  XR FOOT COMP MIN 3 VIEWS RT                        PROCEDURE DATE:  12/13/2021      IMPRESSION:  No acute fracture or dislocation.    EXAM:  CT BRAIN                        PROCEDURE DATE:  12/13/2021      IMPRESSION:  No hydrocephalus, acute intracranial hemorrhage, mass effect, or brain   edema.    EXAM:  CT ABDOMEN AND PELVIS IC                        PROCEDURE DATE:  12/13/2021      IMPRESSION:  No acute findings in the abdomen and pelvis.    EXAM:  CT LUMBAR SPINE                        EXAM:  CT THORACIC SPINE                        PROCEDURE DATE:  12/13/2021      IMPRESSION:  No acute fracture or traumatic subluxation.  Methylmethacrylate is noted within the T9 vertebral body. T9 vertebral   body is moderately compressed. No retropulsed bone.  Degenerative changes in the lumbar region as detailed in the body the   report    EXAM:  US DPLX LWR EXT VEINS COMPL BI                        PROCEDURE DATE:  12/14/2021      IMPRESSION:  No evidence of deep venous thrombosis in either lower extremity.

## 2021-12-14 NOTE — PROGRESS NOTE ADULT - ASSESSMENT
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 Imaging is not suggestive of any acute mechanical processes that could directly be the cause of this back pain. Most likely due to deconditioning from previous trauma and extended bed rest. Will have PT eval the pt.  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 Imaging is not suggestive of any acute mechanical processes that could directly be the cause of this back pain. Most likely due to deconditioning from previous trauma and extended bed rest. Will have PT eval the pt.

## 2021-12-14 NOTE — PROGRESS NOTE ADULT - PROBLEM SELECTOR PLAN 4
-pt on chronic prednisone 5mg and methotrexate once a week (on wednesday)  -cont these medications - Continue home Plavix  - Continue home losartan, metoprolol  - Continue home statin

## 2021-12-14 NOTE — DISCHARGE NOTE NURSING/CASE MANAGEMENT/SOCIAL WORK - PATIENT PORTAL LINK FT
You can access the FollowMyHealth Patient Portal offered by [facility] by registering at the following website: [email or web address]. By joining [employer]’s FollowMyHealth portal, you will also be able to view your health information using other applications (apps) compatible with our system.

## 2021-12-14 NOTE — DISCHARGE NOTE NURSING/CASE MANAGEMENT/SOCIAL WORK - NSDCCRNUMBER_GEN_ALL_CORE_FT
Transporation : Ambulance [facility] [phone] trip # [phone] 5 pm

## 2021-12-14 NOTE — DISCHARGE NOTE NURSING/CASE MANAGEMENT/SOCIAL WORK - NSDCPEFALRISK_GEN_ALL_CORE
For information on Fall & Injury Prevention, visit: [email or web address] OR  [email or web address] OR  https://www.cdc.gov/steadi/patient.html

## 2021-12-14 NOTE — CHART NOTE - NSCHARTNOTEFT_GEN_A_CORE
Search Terms: Camilla Nicole, 1935Search Date: 12/14/2021 16:42:04 PM  Searching on behalf of: [facility]  The Drug Utilization Report below displays all of the controlled substance prescriptions, if any, that your patient has filled in the last twelve months. The information displayed on this report is compiled from pharmacy submissions to the Department, and accurately reflects the information as submitted by the pharmacies.    This report was requested by: Jairo Villalobos | Reference #: [id number]    Others' Prescriptions  Patient Name: Camilla NicoleBirth Date: 1935  Address: [address] [place] [id number]: Female  Rx Written	Rx Dispensed	Drug	Quantity	Days Supply	Prescriber Name	Prescriber Cindy #	Payment Method	Dispenser  10/28/2021	10/29/2021	oxycodone hcl 10 mg tablet	30	30	Ester Molina MD	[id number]	[facility] #[id number]  01/29/2021	01/30/2021	oxycodone hcl 5 mg tablet	30	30	Jeanette Shi	[id number]	[facility] #[id number]

## 2021-12-14 NOTE — PROGRESS NOTE ADULT - PROBLEM SELECTOR PLAN 1
-pt presenting s/p several weeks of back trauma seen at outside hospital  -pt's imaging not suggestive of acute pathology on CT scan  -pt has hx of neuropathy and DM, thus back pain could be deconditioning vs neuropathic pain   -pt appears to not tolerate oxycodone well (dizziness with vomiting), can attempt to treat pain with Tylenol and NSAID multimodal  -PT eval in the AM  -MRI thoracic and lumbar ordered Presenting s/p several weeks of back trauma seen at outside hospital  S/p kyphoplasty to T9 in 11/2021 for fracture  Imaging not suggestive of acute pathology on CT scan  Did not tolerate opioid well outpatient  - PT eval ordered, appreciate recs  - F/u MRI thoracic and lumbar spine  - Lidocaine patch  - Standing Tylenol ordered

## 2021-12-15 ENCOUNTER — TRANSCRIPTION ENCOUNTER (OUTPATIENT)
Age: 86
End: 2021-12-15

## 2021-12-15 LAB
GLUCOSE BLDC GLUCOMTR-MCNC: 202 MG/DL — HIGH (ref 70–99)
GLUCOSE BLDC GLUCOMTR-MCNC: 238 MG/DL — HIGH (ref 70–99)
GLUCOSE BLDC GLUCOMTR-MCNC: 246 MG/DL — HIGH (ref 70–99)
GLUCOSE BLDC GLUCOMTR-MCNC: 286 MG/DL — HIGH (ref 70–99)

## 2021-12-15 PROCEDURE — 72148 MRI LUMBAR SPINE W/O DYE: CPT | Mod: 26

## 2021-12-15 PROCEDURE — 99232 SBSQ HOSP IP/OBS MODERATE 35: CPT | Mod: GC

## 2021-12-15 PROCEDURE — 72146 MRI CHEST SPINE W/O DYE: CPT | Mod: 26

## 2021-12-15 RX ORDER — POLYETHYLENE GLYCOL 3350 17 G/17G
17 POWDER, FOR SOLUTION ORAL ONCE
Refills: 0 | Status: COMPLETED | OUTPATIENT
Start: 2021-12-15 | End: 2021-12-15

## 2021-12-15 RX ADMIN — Medication 12.5 MILLIGRAM(S): at 05:15

## 2021-12-15 RX ADMIN — Medication 3: at 12:41

## 2021-12-15 RX ADMIN — Medication 2: at 08:46

## 2021-12-15 RX ADMIN — OXYCODONE HYDROCHLORIDE 5 MILLIGRAM(S): 5 TABLET ORAL at 12:33

## 2021-12-15 RX ADMIN — Medication 650 MILLIGRAM(S): at 01:02

## 2021-12-15 RX ADMIN — Medication 650 MILLIGRAM(S): at 11:34

## 2021-12-15 RX ADMIN — GABAPENTIN 100 MILLIGRAM(S): 400 CAPSULE ORAL at 05:15

## 2021-12-15 RX ADMIN — OXYCODONE HYDROCHLORIDE 5 MILLIGRAM(S): 5 TABLET ORAL at 13:05

## 2021-12-15 RX ADMIN — ATORVASTATIN CALCIUM 40 MILLIGRAM(S): 80 TABLET, FILM COATED ORAL at 21:22

## 2021-12-15 RX ADMIN — LIDOCAINE 1 PATCH: 4 CREAM TOPICAL at 03:50

## 2021-12-15 RX ADMIN — GABAPENTIN 100 MILLIGRAM(S): 400 CAPSULE ORAL at 14:31

## 2021-12-15 RX ADMIN — Medication 650 MILLIGRAM(S): at 17:46

## 2021-12-15 RX ADMIN — LOSARTAN POTASSIUM 100 MILLIGRAM(S): 100 TABLET, FILM COATED ORAL at 05:24

## 2021-12-15 RX ADMIN — Medication 5 MILLIGRAM(S): at 05:16

## 2021-12-15 RX ADMIN — Medication 650 MILLIGRAM(S): at 06:07

## 2021-12-15 RX ADMIN — Medication 2: at 17:47

## 2021-12-15 RX ADMIN — RIVAROXABAN 20 MILLIGRAM(S): KIT at 11:33

## 2021-12-15 RX ADMIN — LIDOCAINE 1 PATCH: 4 CREAM TOPICAL at 15:30

## 2021-12-15 RX ADMIN — Medication 75 MICROGRAM(S): at 05:15

## 2021-12-15 RX ADMIN — GABAPENTIN 100 MILLIGRAM(S): 400 CAPSULE ORAL at 21:22

## 2021-12-15 RX ADMIN — PANTOPRAZOLE SODIUM 40 MILLIGRAM(S): 20 TABLET, DELAYED RELEASE ORAL at 05:17

## 2021-12-15 RX ADMIN — CLOPIDOGREL BISULFATE 75 MILLIGRAM(S): 75 TABLET, FILM COATED ORAL at 11:33

## 2021-12-15 RX ADMIN — LIDOCAINE 1 PATCH: 4 CREAM TOPICAL at 11:34

## 2021-12-15 NOTE — DISCHARGE NOTE PROVIDER - PROVIDER TOKENS
PROVIDER:[TOKEN:[[id number]],FOLLOWUP:[1 week],ESTABLISHEDPATIENT:[T]],FREE:[LAST:[De Leacy],FIRST:[Reade],PHONE:[phone]],FAX:[(   )    -],ADDRESS:[[address] [place] [address]],FOLLOWUP:[2 weeks],ESTABLISHEDPATIENT:[T]] PROVIDER:[TOKEN:[[id number]],FOLLOWUP:[1 week],ESTABLISHEDPATIENT:[T]],PROVIDER:[TOKEN:[[id number]],FOLLOWUP:[2 weeks]]

## 2021-12-15 NOTE — DISCHARGE NOTE PROVIDER - NSDCCPCAREPLAN_GEN_ALL_CORE_FT
PRINCIPAL DISCHARGE DIAGNOSIS  Diagnosis: Back pain  Assessment and Plan of Treatment: You were admitted to the hospital for worsening back pain. Our workup including CT and X-Ray of the spine did not reveal anything new or concerning. You also underwent MRI, and the results of these scans should be communicated to you after you go home. For your pain, we adjusted your pain medication regimen and prescribed you outpatient physical therapy. You should continue with these interventions and should continue to follow up with your doctors. If you have involuntary loss of urine or stool, have sudden weakness or numbness in your lower extremities, please seek medical attention.      SECONDARY DISCHARGE DIAGNOSES  Diagnosis: DM (diabetes mellitus)  Assessment and Plan of Treatment: During your hospitalization, we also checked your A1C value which was noted to be 7.7. This is a high value for patients with diabetes. For this, you should follow up with your primary care physician to discus the ongoing care of your diabetes. In the meantime, you should continue your oral medications.     PRINCIPAL DISCHARGE DIAGNOSIS  Diagnosis: Back pain  Assessment and Plan of Treatment: You were admitted to the hospital for worsening back pain. Our workup including CT and X-Ray of the spine did not reveal anything new or concerning. You also underwent MRI which revealed a fracture at the T10 vertebrae of your spine, which is the source of your pain. The neurosurgeons evaluated you and stated that you should follow up outpatient with your prior neurosurgeon for further management. For your pain, we adjusted your pain medication regimen and prescribed you outpatient physical therapy. You should continue with these interventions and should continue to follow up with your doctors. If you have involuntary loss of urine or stool, have sudden weakness or numbness in your lower extremities, please seek medical attention.      SECONDARY DISCHARGE DIAGNOSES  Diagnosis: DM (diabetes mellitus)  Assessment and Plan of Treatment: During your hospitalization, we also checked your A1C value which was noted to be 7.7. This is a high value for patients with diabetes. For this, you should follow up with your primary care physician to discus the ongoing care of your diabetes. In the meantime, you should continue your oral medications.     PRINCIPAL DISCHARGE DIAGNOSIS  Diagnosis: Back pain  Assessment and Plan of Treatment: You were admitted to the hospital for worsening back pain. Our workup including CT and X-Ray of the spine did not reveal anything new or concerning. You also underwent MRI which revealed a fracture at the T10 vertebrae of your spine, which is the source of your pain. The neurosurgeons evaluated you and stated that you should follow up outpatient with Dr. Winkler, a neurosurgeon (contact information provided) for further management. For your pain, we adjusted your pain medication regimen and prescribed you outpatient physical therapy. We also provided you with a brace for when you are out of bed. You should continue with these interventions and should continue to follow up with your doctors. If you have involuntary loss of urine or stool, have sudden weakness or numbness in your lower extremities, please seek medical attention.      SECONDARY DISCHARGE DIAGNOSES  Diagnosis: DM (diabetes mellitus)  Assessment and Plan of Treatment: During your hospitalization, we also checked your A1C value which was noted to be 7.7. This is a high value for patients with diabetes. For this, you should follow up with your primary care physician to discus the ongoing care of your diabetes. In the meantime, you should continue your oral medications.     PRINCIPAL DISCHARGE DIAGNOSIS  Diagnosis: Back pain  Assessment and Plan of Treatment: You were admitted to the hospital for worsening back pain. Our workup including CT and X-Ray of the spine did not reveal anything new or concerning. You also underwent MRI which revealed a fracture at the T10 vertebrae of your spine, which is the source of your pain. The neurosurgeons evaluated you and stated that you should follow up outpatient with Dr. Winkler, a neurosurgeon (contact information provided) for further management. For your pain, we adjusted your pain medication regimen and prescribed you outpatient physical therapy. You also can speak with the neurosurgeon about obtaining a back brace for some additional pain control and support. You should continue with these interventions and should continue to follow up with your doctors. If you have involuntary loss of urine or stool, have sudden weakness or numbness in your lower extremities, please seek medical attention.      SECONDARY DISCHARGE DIAGNOSES  Diagnosis: DM (diabetes mellitus)  Assessment and Plan of Treatment: During your hospitalization, we also checked your A1C value which was noted to be 7.7. This is a high value for patients with diabetes. For this, you should follow up with your primary care physician to discus the ongoing care of your diabetes. In the meantime, you should continue your oral medications.

## 2021-12-15 NOTE — DISCHARGE NOTE PROVIDER - CARE PROVIDER_API CALL
HERMILA COYNE  Internal Medicine  [address] [place] [address]  Phone: [phone]  Fax: [phone]  Established Patient  Follow Up Time: 1 week    Zeynep Donohue  [address] [place] [address]  Phone: [phone]  Fax: (   )    -  Established Patient  Follow Up Time: 2 weeks   HERMILA COYNE  Internal Medicine  [address] [place] [address]  Phone: [phone]  Fax: [phone]  Established Patient  Follow Up Time: 1 week    Jermain Winkler (MD; TAMIKA; MS)  Neurosurgery  [address] [facility], [address]  [place] [address]  Phone: [phone]  Fax: [phone]  Follow Up Time: 2 weeks

## 2021-12-15 NOTE — DISCHARGE NOTE PROVIDER - HOSPITAL COURSE
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 History obtained primarily from ED documentation as pt has poor mental status in the setting of dementia. Patient started to have back pain early november, went to [place] in [facility] [place] and was diagnosed with t9 fracture on 11/9, admitted to the hospital, had a kyphoplasty on 11/17 by dr. bryant and was dc to rehab then home. Over last few days pain acutely worsened to mid and lower back, constant, worse with movements, better when sitting up, still severe despite tylenol and oxycodone use. Last tylenol was approx 8am today. Patient took an oxy on saturday night, sunday grandaughter noted her to be dazed and a bit confused, vomited x 3, and has since not taken in much po. Patient and grandaughter deny any fever, cough, ha, neck pain, cp, sob, abd pain, diarrhea, dysuria, new le edema. Patient does note toe pain to r. third and fourth toe-no known trauma. Last fall >6m ago. No focal weakness, numbness, bowel/bladder retention or incontinence.    During her hospitalization, she underwent XR spine, CT spine, and MRI spine that did not reveal any specific underlying cause of her back pain- no acute changes noted. Her pain medications were titrated to minimize her pain and she was ultimately placed on ________________. At time of discharge, she was deemed medically stable and will require follow up with her PMD Dr. Ester Molina for further management. She should also follow up with the doctor who performed her kyphoplasty, Dr. Donohue at [facility] for further evaluation.     She was also noted to be hyperglycemic inpatient and found to have A1C 7.7. This can be discussed with her outpatient provider as above. 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 History obtained primarily from ED documentation as pt has poor mental status in the setting of dementia. Patient started to have back pain early november, went [place] [facility] [place] diagnosed with t9 fracture on 11/9, admitted to the hospital, had a kyphoplasty on 11/17 by dr. bryant and was dc to rehab then home. Over last few days pain acutely worsened to mid and lower back, constant, worse with movements, better when sitting up, still severe despite tylenol and oxycodone use. Last tylenol was approx 8am today. Patient took an oxy on saturday night, sunday grandaughter noted her to be dazed and a bit confused, vomited x 3, and has since not taken in much po. Patient and grandaughter deny any fever, cough, ha, neck pain, cp, sob, abd pain, diarrhea, dysuria, new le edema. Patient does note toe pain to r. third and fourth toe-no known trauma. Last fall >6m ago. No focal weakness, numbness, bowel/bladder retention or incontinence.    During her hospitalization, she underwent XR spine, CT spine, that did not reveal any specific underlying cause of her back pain- no acute changes noted. She also underwent MRI of her spine, but the results of her scans will be communicated to her after her discharge. Her pain medications were titrated to minimize her pain and she was ultimately placed on oxycodone 5mg q6hr. At time of discharge, she was deemed medically stable and will require follow up with her PMD Dr. Ester Molina for further management. She should also follow up with the doctor who performed her kyphoplasty, Dr. Donohue at [facility] for further evaluation.     She was also noted to be hyperglycemic inpatient and found to have A1C 7.7. This can be discussed with her outpatient provider as above. 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 History obtained primarily from ED documentation as pt has poor mental status in the setting of dementia. Patient started to have back pain early november, went to [place] [facility] and was diagnosed with t9 fracture on 11/9, admitted to the hospital, had a kyphoplasty on 11/17 by dr. bryant and was dc to rehab then home. Over last few days pain acutely worsened to mid and lower back, constant, worse with movements, better when sitting up, still severe despite tylenol and oxycodone use. Last tylenol was approx 8am today. Patient took an oxy on saturday night, sunday grandaughter noted her to be dazed and a bit confused, vomited x 3, and has since not taken in much po. Patient and grandaughter deny any fever, cough, ha, neck pain, cp, sob, abd pain, diarrhea, dysuria, new le edema. Patient does note toe pain to r. third and fourth toe-no known trauma. Last fall >6m ago. No focal weakness, numbness, bowel/bladder retention or incontinence.    During her hospitalization, she underwent XR spine, CT spine, that did not reveal any specific underlying cause of her back pain- no acute changes noted. She also underwent MRI of her spine which revealed an acute T10 fracture, which is thought to be the source of her pain. She was evaluated by neurosurgery, who stated that there is no acute intervention required for said fracture. Her pain medications were titrated to minimize her pain and she was ultimately placed on oxycodone 5mg q6hr. At time of discharge, she was deemed medically stable and will require follow up with her PMD Dr. Ester Molina for further management. She should also follow up with the doctor who performed her kyphoplasty, Dr. Donohue at [facility] for further evaluation and management of her T10 fracture.    She was also noted to be hyperglycemic inpatient and found to have A1C 7.7. This can be discussed with her outpatient provider as above. 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 History obtained primarily from ED documentation as pt has poor mental status in the setting of dementia. Patient started to have back pain early november, went to [place] in [facility] [place] and was diagnosed with t9 fracture on 11/9, admitted to the hospital, had a kyphoplasty on 11/17 by dr. bryatn and was dc to rehab then home. Over last few days pain acutely worsened to mid and lower back, constant, worse with movements, better when sitting up, still severe despite tylenol and oxycodone use. Last tylenol was approx 8am today. Patient took an oxy on saturday night, sunday grandaughter noted her to be dazed and a bit confused, vomited x 3, and has since not taken in much po. Patient and grandaughter deny any fever, cough, ha, neck pain, cp, sob, abd pain, diarrhea, dysuria, new le edema. Patient does note toe pain to r. third and fourth toe-no known trauma. Last fall >6m ago. No focal weakness, numbness, bowel/bladder retention or incontinence.    During her hospitalization, she underwent XR spine, CT spine, that did not reveal any specific underlying cause of her back pain- no acute changes noted. She also underwent MRI of her spine which revealed an acute T10 fracture, which is thought to be the source of her pain. She was evaluated by neurosurgery, who stated that there is no acute intervention required for said fracture. Her pain medications were titrated to minimize her pain and she was ultimately placed on oxycodone 5mg q6hr. At time of discharge, she was deemed medically stable and will require follow up with her PMD Dr. Ester Molina for further management. She should also follow up with neurosurgeon Dr. Winkler for further evaluation and management of her T10 fracture.    She was also noted to be hyperglycemic inpatient and found to have A1C 7.7. This can be discussed with her outpatient provider as above. 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 History obtained primarily from ED documentation as pt has poor mental status in the setting of dementia. Patient started to have back pain early november, went to [place] in [facility] [place] and was diagnosed with t9 fracture on 11/9, admitted to the hospital, had a kyphoplasty on 11/17 by dr. bryant and was dc to rehab then home. Over last few days pain acutely worsened to mid and lower back, constant, worse with movements, better when sitting up, still severe despite tylenol and oxycodone use. Last tylenol was approx 8am today. Patient took an oxy on saturday night, sunday grandaughter noted her to be dazed and a bit confused, vomited x 3, and has since not taken in much po. Patient and grandaughter deny any fever, cough, ha, neck pain, cp, sob, abd pain, diarrhea, dysuria, new le edema. Patient does note toe pain to r. third and fourth toe-no known trauma. Last fall >6m ago. No focal weakness, numbness, bowel/bladder retention or incontinence.    During her hospitalization, she underwent XR spine, CT spine, that did not reveal any specific underlying cause of her back pain- no acute changes noted. She also underwent MRI of her spine which revealed an acute T10 fracture, which is thought to be the source of her pain. She was evaluated by neurosurgery, who stated that there is no acute intervention required for said fracture. Her pain medications were titrated to minimize her pain and she was ultimately placed on oxycodone 5mg q6hr. At time of discharge, she was deemed medically stable and will require follow up with her PMD Dr. Ester Molina for further management. She should also follow up with neurosurgeon Dr. Winkler for further evaluation and management of her T10 fracture. She was offered a brace by neurosurgery, but will be discharged home without the brace due to issues with obtaining the brace prior to discharge. She understands this and will follow up with neurosurgery.     She was also noted to be hyperglycemic inpatient and found to have A1C 7.7. This can be discussed with her outpatient provider as above.

## 2021-12-15 NOTE — PROGRESS NOTE ADULT - SUBJECTIVE AND OBJECTIVE BOX
PROGRESS NOTE:   Authored by Jairo Villalobos MD  Pager: [facility] [phone]; [facility] [phone]    Patient is a 86y old  Female who presents with a chief complaint of Back pain (14 Dec 2021 08:03)      SUBJECTIVE / OVERNIGHT EVENTS:  No acute events overnight.    ADDITIONAL REVIEW OF SYSTEMS:  CONSTITUTIONAL: No weakness, fevers or chills  EYES/ENT: No visual changes;  No vertigo or throat pain   NECK: No pain or stiffness  RESPIRATORY: No cough, wheezing, hemoptysis; No shortness of breath  CARDIOVASCULAR: No chest pain or palpitations  GASTROINTESTINAL: No abdominal or epigastric pain. No nausea, vomiting, or hematemesis; No diarrhea or constipation. No melena or hematochezia.  GENITOURINARY: No dysuria, frequency or hematuria  NEUROLOGICAL: No numbness or weakness  All other review of systems is negative unless indicated above.    MEDICATIONS  (STANDING):  acetaminophen     Tablet .. 650 milliGRAM(s) Oral every 6 hours  atorvastatin 40 milliGRAM(s) Oral at bedtime  clopidogrel Tablet 75 milliGRAM(s) Oral daily  dextrose 40% Gel 15 Gram(s) Oral once  dextrose 5%. 1000 milliLiter(s) (50 mL/Hr) IV Continuous <Continuous>  dextrose 5%. 1000 milliLiter(s) (100 mL/Hr) IV Continuous <Continuous>  dextrose 50% Injectable 25 Gram(s) IV Push once  dextrose 50% Injectable 12.5 Gram(s) IV Push once  dextrose 50% Injectable 25 Gram(s) IV Push once  gabapentin 100 milliGRAM(s) Oral three times a day  glucagon  Injectable 1 milliGRAM(s) IntraMuscular once  insulin lispro (ADMELOG) corrective regimen sliding scale   SubCutaneous three times a day before meals  insulin lispro (ADMELOG) corrective regimen sliding scale   SubCutaneous at bedtime  levothyroxine 75 MICROGram(s) Oral daily  lidocaine   4% Patch 1 Patch Transdermal daily  losartan 100 milliGRAM(s) Oral daily  metoprolol tartrate 12.5 milliGRAM(s) Oral daily  pantoprazole    Tablet 40 milliGRAM(s) Oral before breakfast  predniSONE   Tablet 5 milliGRAM(s) Oral daily  rivaroxaban 20 milliGRAM(s) Oral daily    MEDICATIONS  (PRN):  aluminum hydroxide/magnesium hydroxide/simethicone Suspension 30 milliLiter(s) Oral every 4 hours PRN Dyspepsia  melatonin 3 milliGRAM(s) Oral at bedtime PRN Insomnia  ondansetron Injectable 4 milliGRAM(s) IV Push every 8 hours PRN Nausea and/or Vomiting  oxyCODONE    IR 5 milliGRAM(s) Oral two times a day PRN Severe Pain (7 - 10)      CAPILLARY BLOOD GLUCOSE      POCT Blood Glucose.: 221 mg/dL (14 Dec 2021 21:42)  POCT Blood Glucose.: 317 mg/dL (14 Dec 2021 17:41)  POCT Blood Glucose.: 147 mg/dL (14 Dec 2021 12:25)    I&O's Summary      PHYSICAL EXAM:  Vital Signs Last 24 Hrs  T(C): 36.3 (15 Dec 2021 05:10), Max: 36.9 (14 Dec 2021 16:55)  T(F): 97.4 (15 Dec 2021 05:10), Max: 98.5 (14 Dec 2021 16:55)  HR: 64 (15 Dec 2021 05:10) (64 - 80)  BP: 146/77 (15 Dec 2021 05:10) (135/75 - 146/77)  BP(mean): --  RR: 18 (15 Dec 2021 05:10) (17 - 18)  SpO2: 98% (15 Dec 2021 05:10) (97% - 100%)    GENERAL: No acute distress, well-developed  HEAD:  Atraumatic, Normocephalic  CHEST/LUNG: CTAB; No wheezes, rales, or rhonchi  HEART: Regular rate and rhythm; Systolic murmur noted  ABDOMEN: Soft, non-tender, non-distended; normal bowel sounds  EXTREMITIES:  2+ peripheral pulses b/l, No clubbing, cyanosis, or edema  NEUROLOGY: A&O x 3, no focal deficits    LABS:                        12.2   8.74  )-----------( 282      ( 13 Dec 2021 16:50 )             39.8     12-13    138  |  97<L>  |  20  ----------------------------<  132<H>  4.1   |  26  |  1.14    Ca    10.0      13 Dec 2021 16:50    TPro  7.8  /  Alb  4.5  /  TBili  0.4  /  DBili  x   /  AST  14  /  ALT  10  /  AlkPhos  84  12-13    PT/INR - ( 13 Dec 2021 16:50 )   PT: 13.0 sec;   INR: 1.14 ratio         PTT - ( 13 Dec 2021 16:50 )  PTT:28.1 sec      Urinalysis Basic - ( 13 Dec 2021 22:25 )    Color: Light Yellow / Appearance: Clear / SG: >1.050 / pH: x  Gluc: x / Ketone: Trace  / Bili: Negative / Urobili: <2 mg/dL   Blood: x / Protein: Trace / Nitrite: Negative   Leuk Esterase: Large / RBC: 1 /HPF / WBC 13 /HPF   Sq Epi: x / Non Sq Epi: 5 /HPF / Bacteria: Few          RADIOLOGY & ADDITIONAL TESTS:  None PROGRESS NOTE:   Authored by Jairo Villalobos MD  Pager: [facility] [phone]; [facility] [phone]    Patient is a 86y old  Female who presents with a chief complaint of Back pain (14 Dec 2021 08:03)      SUBJECTIVE / OVERNIGHT EVENTS:  No acute events overnight. Today mentions that she has ongoing back pain. Reports oxycodone helped.   Denies any new complaints.    ADDITIONAL REVIEW OF SYSTEMS:  CONSTITUTIONAL: +weakness, No fevers or chills  RESPIRATORY: No cough, wheezing, hemoptysis; No shortness of breath  CARDIOVASCULAR: No chest pain or palpitations  GASTROINTESTINAL: No abdominal or epigastric pain. No nausea, vomiting, or hematemesis; No diarrhea, +constipation. No melena or hematochezia.  GENITOURINARY: No dysuria, frequency or hematuria  NEUROLOGICAL: No numbness or weakness  All other review of systems is negative unless indicated above.    MEDICATIONS  (STANDING):  acetaminophen     Tablet .. 650 milliGRAM(s) Oral every 6 hours  atorvastatin 40 milliGRAM(s) Oral at bedtime  clopidogrel Tablet 75 milliGRAM(s) Oral daily  dextrose 40% Gel 15 Gram(s) Oral once  dextrose 5%. 1000 milliLiter(s) (50 mL/Hr) IV Continuous <Continuous>  dextrose 5%. 1000 milliLiter(s) (100 mL/Hr) IV Continuous <Continuous>  dextrose 50% Injectable 25 Gram(s) IV Push once  dextrose 50% Injectable 12.5 Gram(s) IV Push once  dextrose 50% Injectable 25 Gram(s) IV Push once  gabapentin 100 milliGRAM(s) Oral three times a day  glucagon  Injectable 1 milliGRAM(s) IntraMuscular once  insulin lispro (ADMELOG) corrective regimen sliding scale   SubCutaneous three times a day before meals  insulin lispro (ADMELOG) corrective regimen sliding scale   SubCutaneous at bedtime  levothyroxine 75 MICROGram(s) Oral daily  lidocaine   4% Patch 1 Patch Transdermal daily  losartan 100 milliGRAM(s) Oral daily  metoprolol tartrate 12.5 milliGRAM(s) Oral daily  pantoprazole    Tablet 40 milliGRAM(s) Oral before breakfast  predniSONE   Tablet 5 milliGRAM(s) Oral daily  rivaroxaban 20 milliGRAM(s) Oral daily    MEDICATIONS  (PRN):  aluminum hydroxide/magnesium hydroxide/simethicone Suspension 30 milliLiter(s) Oral every 4 hours PRN Dyspepsia  melatonin 3 milliGRAM(s) Oral at bedtime PRN Insomnia  ondansetron Injectable 4 milliGRAM(s) IV Push every 8 hours PRN Nausea and/or Vomiting  oxyCODONE    IR 5 milliGRAM(s) Oral two times a day PRN Severe Pain (7 - 10)      CAPILLARY BLOOD GLUCOSE      POCT Blood Glucose.: 221 mg/dL (14 Dec 2021 21:42)  POCT Blood Glucose.: 317 mg/dL (14 Dec 2021 17:41)  POCT Blood Glucose.: 147 mg/dL (14 Dec 2021 12:25)    I&O's Summary      PHYSICAL EXAM:  Vital Signs Last 24 Hrs  T(C): 36.3 (15 Dec 2021 05:10), Max: 36.9 (14 Dec 2021 16:55)  T(F): 97.4 (15 Dec 2021 05:10), Max: 98.5 (14 Dec 2021 16:55)  HR: 64 (15 Dec 2021 05:10) (64 - 80)  BP: 146/77 (15 Dec 2021 05:10) (135/75 - 146/77)  BP(mean): --  RR: 18 (15 Dec 2021 05:10) (17 - 18)  SpO2: 98% (15 Dec 2021 05:10) (97% - 100%)    GENERAL: No acute distress, well-developed  HEAD:  Atraumatic, Normocephalic  CHEST/LUNG: CTAB; No wheezes, rales, or rhonchi  HEART: Regular rate and rhythm; Systolic murmur noted  ABDOMEN: Soft, non-tender, non-distended; normal bowel sounds  EXTREMITIES:  2+ peripheral pulses b/l, No clubbing, cyanosis, or edema  NEUROLOGY: A&O x 3, no focal deficits; Focal tenderness noted over thoracic/lumbar spine; 4-5/5 strength in b/l upper & lower extremities    LABS:                        12.2   8.74  )-----------( 282      ( 13 Dec 2021 16:50 )             39.8     12-13    138  |  97<L>  |  20  ----------------------------<  132<H>  4.1   |  26  |  1.14    Ca    10.0      13 Dec 2021 16:50    TPro  7.8  /  Alb  4.5  /  TBili  0.4  /  DBili  x   /  AST  14  /  ALT  10  /  AlkPhos  84  12-13    PT/INR - ( 13 Dec 2021 16:50 )   PT: 13.0 sec;   INR: 1.14 ratio         PTT - ( 13 Dec 2021 16:50 )  PTT:28.1 sec      Urinalysis Basic - ( 13 Dec 2021 22:25 )    Color: Light Yellow / Appearance: Clear / SG: >1.050 / pH: x  Gluc: x / Ketone: Trace  / Bili: Negative / Urobili: <2 mg/dL   Blood: x / Protein: Trace / Nitrite: Negative   Leuk Esterase: Large / RBC: 1 /HPF / WBC 13 /HPF   Sq Epi: x / Non Sq Epi: 5 /HPF / Bacteria: Few          RADIOLOGY & ADDITIONAL TESTS:  None

## 2021-12-15 NOTE — DISCHARGE NOTE PROVIDER - NSDCMRMEDTOKEN_GEN_ALL_CORE_FT
atorvastatin 40 mg oral tablet: 1 tab(s) orally once a day  Farxiga 10 mg oral tablet: 1 tab(s) orally once a day  folic acid 1 mg oral tablet: 1 tab(s) orally once a day  gabapentin 100 mg oral capsule: 1 cap(s) orally 3 times a day  HumaLOG Mo KwikPen 100 units/mL injectable solution: 4 unit(s) injectable 3 times a day  Lasix 40 mg oral tablet: 1 tab(s) orally once a day  levothyroxine 75 mcg (0.075 mg) oral tablet: 1 tab(s) orally once a day  losartan 100 mg oral tablet: 1 tab(s) orally once a day  metFORMIN 500 mg oral tablet: 1 tab(s) orally 3 times a day  methotrexate 10 mg oral tablet: 1 tab(s) orally once a week on wednesday  Metoprolol Succinate ER: 12.5 milligram(s) orally once a day  Outpatient Physical Therapy: Outpatient Physical Therapy    3-5x/week    ICD-10: R26.81, unsteadiness on feet  oxyCODONE 5 mg oral tablet: 1 tab(s) orally every 6 hours  Plavix 75 mg oral tablet: 1 tab(s) orally once a day  predniSONE 5 mg oral tablet: 1 tab(s) orally once a day  Protonix 40 mg oral delayed release tablet: 1 tab(s) orally once a day  Vitamin D3 50 mcg (2000 intl units) oral capsule: 1 cap(s) orally once a day  Xarelto 20 mg oral tablet: 1 tab(s) orally once a day (in the evening)   atorvastatin 40 mg oral tablet: 1 tab(s) orally once a day  Farxiga 10 mg oral tablet: 1 tab(s) orally once a day  folic acid 1 mg oral tablet: 1 tab(s) orally once a day  gabapentin 100 mg oral capsule: 1 cap(s) orally 3 times a day  HumaLOG Mo KwikPen 100 units/mL injectable solution: 4 unit(s) injectable 3 times a day  Lasix 40 mg oral tablet: 1 tab(s) orally once a day  levothyroxine 75 mcg (0.075 mg) oral tablet: 1 tab(s) orally once a day  losartan 100 mg oral tablet: 1 tab(s) orally once a day  metFORMIN 500 mg oral tablet: 1 tab(s) orally 3 times a day  methotrexate 10 mg oral tablet: 1 tab(s) orally once a week on wednesday  Metoprolol Succinate ER: 12.5 milligram(s) orally once a day  Outpatient Physical Therapy: Outpatient Physical Therapy    3-5x/week    ICD-10: R26.81, unsteadiness on feet  Plavix 75 mg oral tablet: 1 tab(s) orally once a day  predniSONE 5 mg oral tablet: 1 tab(s) orally once a day  Protonix 40 mg oral delayed release tablet: 1 tab(s) orally once a day  Vitamin D3 50 mcg (2000 intl units) oral capsule: 1 cap(s) orally once a day  Xarelto 20 mg oral tablet: 1 tab(s) orally once a day (in the evening)   acetaminophen 325 mg oral tablet: 2 tab(s) orally every 6 hours, As Needed -for moderate pain - for mild pain   atorvastatin 40 mg oral tablet: 1 tab(s) orally once a day  Farxiga 10 mg oral tablet: 1 tab(s) orally once a day  folic acid 1 mg oral tablet: 1 tab(s) orally once a day  gabapentin 100 mg oral capsule: 1 cap(s) orally 3 times a day  HumaLOG Mo KwikPen 100 units/mL injectable solution: 4 unit(s) injectable 3 times a day  Lasix 40 mg oral tablet: 1 tab(s) orally once a day  levothyroxine 75 mcg (0.075 mg) oral tablet: 1 tab(s) orally once a day  losartan 100 mg oral tablet: 1 tab(s) orally once a day  metFORMIN 500 mg oral tablet: 1 tab(s) orally 3 times a day  methotrexate 10 mg oral tablet: 1 tab(s) orally once a week on wednesday  Metoprolol Succinate ER: 12.5 milligram(s) orally once a day  Outpatient Physical Therapy: Outpatient Physical Therapy    3-5x/week    ICD-10: R26.81, unsteadiness on feet  oxyCODONE 5 mg oral tablet: 1 tab(s) orally every 6 hours, As Needed -for severe pain MDD:20mg  Plavix 75 mg oral tablet: 1 tab(s) orally once a day  predniSONE 5 mg oral tablet: 1 tab(s) orally once a day  Protonix 40 mg oral delayed release tablet: 1 tab(s) orally once a day  Vitamin D3 50 mcg (2000 intl units) oral capsule: 1 cap(s) orally once a day  Xarelto 20 mg oral tablet: 1 tab(s) orally once a day (in the evening)

## 2021-12-15 NOTE — PROGRESS NOTE ADULT - ASSESSMENT
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 Imaging is not suggestive of any acute mechanical processes that could directly be the cause of this back pain. Most likely due to deconditioning from previous trauma and extended bed rest. Will have PT eval the pt.        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 Imaging is not suggestive of any acute mechanical processes that could directly be the cause of this back pain. Most likely due to deconditioning from previous trauma and extended bed rest. PT recommending home PT.

## 2021-12-15 NOTE — PROGRESS NOTE ADULT - PROBLEM SELECTOR PLAN 1
Presenting s/p several weeks of back trauma seen at outside hospital  S/p kyphoplasty to T9 in 11/2021 for fracture  Imaging not suggestive of acute pathology on CT scan  Did not tolerate opioid well outpatient  - PT saw patient, recommended outpatient rehab  - F/u MRI thoracic and lumbar spine  - Lidocaine patch  - Standing Tylenol ordered  - Oxycodone prn for breakthrough pain Presenting s/p several weeks of back trauma seen at outside hospital  S/p kyphoplasty to T9 in 11/2021 for fracture  Imaging not suggestive of acute pathology on CT scan  Did not tolerate opioid well outpatient  - PT saw patient, recommended outpatient rehab  - F/u MRI thoracic and lumbar spine  - Lidocaine patch  - Standing Tylenol ordered  - Oxycodone 5mg prn for breakthrough pain

## 2021-12-16 VITALS
DIASTOLIC BLOOD PRESSURE: 68 MMHG | SYSTOLIC BLOOD PRESSURE: 147 MMHG | HEART RATE: 60 BPM | OXYGEN SATURATION: 97 % | RESPIRATION RATE: 18 BRPM | TEMPERATURE: 99 F

## 2021-12-16 DIAGNOSIS — R82.71 BACTERIURIA: ICD-10-CM

## 2021-12-16 LAB
-  AMIKACIN: SIGNIFICANT CHANGE UP
-  AZTREONAM: SIGNIFICANT CHANGE UP
-  CEFEPIME: SIGNIFICANT CHANGE UP
-  CEFTAZIDIME: SIGNIFICANT CHANGE UP
-  CIPROFLOXACIN: SIGNIFICANT CHANGE UP
-  GENTAMICIN: SIGNIFICANT CHANGE UP
-  IMIPENEM: SIGNIFICANT CHANGE UP
-  LEVOFLOXACIN: SIGNIFICANT CHANGE UP
-  MEROPENEM: SIGNIFICANT CHANGE UP
-  PIPERACILLIN/TAZOBACTAM: SIGNIFICANT CHANGE UP
-  TOBRAMYCIN: SIGNIFICANT CHANGE UP
CULTURE RESULTS: SIGNIFICANT CHANGE UP
GLUCOSE BLDC GLUCOMTR-MCNC: 188 MG/DL — HIGH (ref 70–99)
GLUCOSE BLDC GLUCOMTR-MCNC: 212 MG/DL — HIGH (ref 70–99)
GLUCOSE BLDC GLUCOMTR-MCNC: 274 MG/DL — HIGH (ref 70–99)
METHOD TYPE: SIGNIFICANT CHANGE UP
ORGANISM # SPEC MICROSCOPIC CNT: SIGNIFICANT CHANGE UP
ORGANISM # SPEC MICROSCOPIC CNT: SIGNIFICANT CHANGE UP
SPECIMEN SOURCE: SIGNIFICANT CHANGE UP

## 2021-12-16 PROCEDURE — 62270 DX LMBR SPI PNXR: CPT

## 2021-12-16 PROCEDURE — 99239 HOSP IP/OBS DSCHRG MGMT >30: CPT

## 2021-12-16 PROCEDURE — 61070 BRAIN CANAL SHUNT PROCEDURE: CPT

## 2021-12-16 RX ORDER — OXYCODONE HYDROCHLORIDE 5 MG/1
1 TABLET ORAL
Qty: 0 | Refills: 0 | DISCHARGE

## 2021-12-16 RX ORDER — ACETAMINOPHEN 500 MG
2 TABLET ORAL
Qty: 112 | Refills: 0
Start: 2021-12-16 | End: 2021-12-29

## 2021-12-16 RX ORDER — OXYCODONE HYDROCHLORIDE 5 MG/1
1 TABLET ORAL
Qty: 20 | Refills: 0
Start: 2021-12-16 | End: 2021-12-20

## 2021-12-16 RX ADMIN — RIVAROXABAN 20 MILLIGRAM(S): KIT at 11:09

## 2021-12-16 RX ADMIN — Medication 75 MICROGRAM(S): at 05:14

## 2021-12-16 RX ADMIN — OXYCODONE HYDROCHLORIDE 5 MILLIGRAM(S): 5 TABLET ORAL at 09:54

## 2021-12-16 RX ADMIN — GABAPENTIN 100 MILLIGRAM(S): 400 CAPSULE ORAL at 05:14

## 2021-12-16 RX ADMIN — PANTOPRAZOLE SODIUM 40 MILLIGRAM(S): 20 TABLET, DELAYED RELEASE ORAL at 05:13

## 2021-12-16 RX ADMIN — Medication 650 MILLIGRAM(S): at 05:14

## 2021-12-16 RX ADMIN — OXYCODONE HYDROCHLORIDE 5 MILLIGRAM(S): 5 TABLET ORAL at 10:24

## 2021-12-16 RX ADMIN — CLOPIDOGREL BISULFATE 75 MILLIGRAM(S): 75 TABLET, FILM COATED ORAL at 11:09

## 2021-12-16 RX ADMIN — Medication 1: at 08:33

## 2021-12-16 RX ADMIN — Medication 3: at 12:47

## 2021-12-16 RX ADMIN — Medication 650 MILLIGRAM(S): at 00:40

## 2021-12-16 RX ADMIN — LIDOCAINE 1 PATCH: 4 CREAM TOPICAL at 11:10

## 2021-12-16 RX ADMIN — Medication 650 MILLIGRAM(S): at 11:07

## 2021-12-16 RX ADMIN — GABAPENTIN 100 MILLIGRAM(S): 400 CAPSULE ORAL at 14:15

## 2021-12-16 RX ADMIN — LOSARTAN POTASSIUM 100 MILLIGRAM(S): 100 TABLET, FILM COATED ORAL at 05:13

## 2021-12-16 RX ADMIN — Medication 12.5 MILLIGRAM(S): at 05:13

## 2021-12-16 RX ADMIN — Medication 5 MILLIGRAM(S): at 05:13

## 2021-12-16 NOTE — PROGRESS NOTE ADULT - PROBLEM SELECTOR PLAN 1
Presenting s/p several weeks of back trauma seen at outside hospital  S/p kyphoplasty to T9 in 11/2021 for fracture  Imaging not suggestive of acute pathology on CT scan  Did not tolerate opioid well outpatient  - PT saw patient, recommended outpatient rehab  - F/u MRI thoracic and lumbar spine  - Lidocaine patch  - Standing Tylenol ordered  - Oxycodone 5mg prn for breakthrough pain Presenting s/p several weeks of back trauma seen at outside hospital  S/p kyphoplasty to T9 in 11/2021 for fracture  MRI 12/15 notable for acute T10 fracture  - PT saw patient, recommended outpatient rehab  - F/u MRI thoracic and lumbar spine  - Lidocaine patch  - Standing Tylenol ordered  - Oxycodone 5mg prn for breakthrough pain  - Spine surgery consult for comment on need for intervention

## 2021-12-16 NOTE — PROGRESS NOTE ADULT - ASSESSMENT
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 Imaging is not suggestive of any acute mechanical processes that could directly be the cause of this back pain. Most likely due to deconditioning from previous trauma and extended bed rest. PT recommending home PT.        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 Imaging is not suggestive of any acute mechanical processes that could directly be the cause of this back pain. Most likely due to deconditioning from previous trauma and extended bed rest. PT recommending outpatient PT.     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 Imaging is not suggestive of any acute mechanical processes that could directly be the cause of this back pain. Most likely due to acute T10 fracture seen on MRI. PT recommending outpatient PT.

## 2021-12-16 NOTE — PROGRESS NOTE ADULT - PROBLEM SELECTOR PLAN 4
- Continue home Plavix  - Continue home losartan, metoprolol  - Continue home statin On home metformin & SGLT2 inhibitor  A1C 7.7%  - Continue ISS  - Holding home oral meds

## 2021-12-16 NOTE — PROGRESS NOTE ADULT - ATTENDING COMMENTS
86F with PMH of HTN, HLD, RA, OA, hypothyroidism, LLE DVT on Xarelto who presents w/ intractable back pain. Hx notable for recent kyphoplasty. No falls, no recent traumas. Admitted for further eval given intractable pain and difficulty w/ ambulation. MRI in progress. Pain control still suboptimal. Pt had not taken her PRN meds. Counseled on trying it to optimize pain control. Anticipate dispo in next 24h if pain improves and MRI unremarkable for any acute process.
86F with PMH of HTN, HLD, RA, OA, hypothyroidism, LLE DVT on Xarelto who presents w/ intractable back pain. Hx notable for recent kyphoplasty. No falls, no recent traumas. Admitted for further eval given intractable pain and difficulty w/ ambulation. MRI pending. Pain control as above. PT evaluation.
86F with PMH of HTN, HLD, RA, OA, hypothyroidism, LLE DVT on Xarelto who presents w/ intractable back pain. Hx notable for recent kyphoplasty. No falls, no recent traumas. Admitted for further eval given intractable pain and difficulty w/ ambulation. MRI of spine done. New T10 comp fracture noted. Pt started on pain regimen, which seems to be helping. Neurosurg c/s for further input. TLSO brace recommended. No surgical interventions planned.    -F/u TLSO brace. If pt has one at home, she does not need to wait for new one.  -Continue pain meds - can use oxy PRN for severe pain. Pt in agreement  -Dispo planning.

## 2021-12-16 NOTE — PROGRESS NOTE ADULT - PROBLEM SELECTOR PLAN 5
Patient on chronic prednisone 5mg and methotrexate once a week (on Wednesday)  - Continue home meds - Continue home Plavix  - Continue home losartan, metoprolol  - Continue home statin

## 2021-12-16 NOTE — PROGRESS NOTE ADULT - PROBLEM SELECTOR PLAN 6
- Pain control as above Patient on chronic prednisone 5mg and methotrexate once a week (on Wednesday)  - Continue home meds

## 2021-12-16 NOTE — CONSULT NOTE ADULT - SUBJECTIVE AND OBJECTIVE BOX
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 History obtained primarily from ED documentation as pt has poor mental status in the setting of dementia. Patient started to have back pain early november, went to [facility] [place] [facility] and was diagnosed with t9 fracture on 11/9, admitted to the hospital, had a kyphoplasty on 11/17 by dr. bryant and was dc to rehab then home. Over last few days pain acutely worsened to mid and lower back, constant, worse with movements, better when sitting up, still severe despite tylenol and oxycodone use. Last tylenol was approx 8am today. Patient took an oxy on saturday night, sunday grandaughter noted her to be dazed and a bit confused, vomited x 3, and has since not taken in much po. Patient and grandaughter deny any fever, cough, ha, neck pain, cp, sob, abd pain, diarrhea, dysuria, new le edema. Patient does note toe pain to r. third and fourth toe-no known trauma. Last fall >6m ago. No focal weakness, numbness, bowel/bladder retention or incontinence.   (13 Dec 2021 21:15)      RADIOLOGY:   < from: MR Thoracic Spine No Cont (12.15.21 @ 18:07) >    THORACIC SPINE MRI:    Redemonstration of chronic moderate compression deformity of T9. The   demonstration of methylmethacrylate within the T9 vertebral body. No   retropulsed bone at this level.    Mild compression deformity of the superior endplate of T10. Edema within   the superior endplate, consistent with acute fracture. No retropulsed   bone.    Remaining visualized vertebrae demonstrate normal height and marrow   signal homogeneity. Facet alignment is maintained. Exaggeration of the   thoracic kyphosis.    No spinal cord compression or abnormal intrinsic cord signal.    Mild multilevel degenerative changes of the thoracic spine.    Vertebral body height, marrow signal homogeneity, and facet alignment are   maintained throughout the visualized spinal segments.    Disc space narrowing at L1-L2.    The conus is normal in size, position, andsignal characteristics, ending   at L1.    L1-L2: Mild disc bulge. Mild spinal canal stenosis. Minimal bilateral   neural foraminal narrowing.    L2-L3: Disc bulge. Mild bilateral facet hypertrophy. Mild thecal sac   compression. Mild bilateral neural foraminal narrowing.    L3-L4: Mild disc bulge. Mild bilateral facet and ligamentous hypertrophy.   Mild thecal sac compression. Mild bilateral neural foraminal narrowing.    L4-L5: Disc bulge and significant bilateral ligamentous hypertrophy. Mild   to moderate thecal sac compression, primarily due to the posterior   ligamentous hypertrophy. Mild right neural foraminal narrowing.    L5-S1: Mild broad-based disc protrusion asymmetric to the right. Mild   ligamentous hypertrophy. Epidural lipomatosis resulting in triangulation   of the thecal sac. No mass effect upon the descending S1 nerve roots or   thecal sac. Mild right neural foraminal narrowing.    IMPRESSION:    THORACIC SPINE MRI:    Acute mild compression fracture of the superior endplate of T10. No   retropulsed bone.    Similar chronic moderate compression fracture of T9 which has been   treated with methylmethacrylate. No retropulsed bone.    No spinal cord compression or abnormal intrinsic cord signal.    Mild multilevel degenerative changes.    LUMBAR SPINE MRI:    Multilevel degenerative changes as detailed in the body the report.    Dr. Heath discussed these findings with on 12/16/2021 9:55 AM with read   back.        MEDS:  acetaminophen     Tablet .. 650 milliGRAM(s) Oral every 6 hours  aluminum hydroxide/magnesium hydroxide/simethicone Suspension 30 milliLiter(s) Oral every 4 hours PRN  atorvastatin 40 milliGRAM(s) Oral at bedtime  clopidogrel Tablet 75 milliGRAM(s) Oral daily  dextrose 40% Gel 15 Gram(s) Oral once  dextrose 5%. 1000 milliLiter(s) IV Continuous <Continuous>  dextrose 5%. 1000 milliLiter(s) IV Continuous <Continuous>  dextrose 50% Injectable 25 Gram(s) IV Push once  dextrose 50% Injectable 12.5 Gram(s) IV Push once  dextrose 50% Injectable 25 Gram(s) IV Push once  gabapentin 100 milliGRAM(s) Oral three times a day  glucagon  Injectable 1 milliGRAM(s) IntraMuscular once  insulin lispro (ADMELOG) corrective regimen sliding scale   SubCutaneous three times a day before meals  insulin lispro (ADMELOG) corrective regimen sliding scale   SubCutaneous at bedtime  levothyroxine 75 MICROGram(s) Oral daily  lidocaine   4% Patch 1 Patch Transdermal daily  losartan 100 milliGRAM(s) Oral daily  melatonin 3 milliGRAM(s) Oral at bedtime PRN  metoprolol tartrate 12.5 milliGRAM(s) Oral daily  ondansetron Injectable 4 milliGRAM(s) IV Push every 8 hours PRN  oxyCODONE    IR 5 milliGRAM(s) Oral two times a day PRN  pantoprazole    Tablet 40 milliGRAM(s) Oral before breakfast  predniSONE   Tablet 5 milliGRAM(s) Oral daily  rivaroxaban 20 milliGRAM(s) Oral daily      PHYSICAL EXAM:  Vital Signs Last 24 Hrs  T(C): 36.7 (16 Dec 2021 05:04), Max: 36.7 (15 Dec 2021 21:07)  T(F): 98 (16 Dec 2021 05:04), Max: 98.1 (15 Dec 2021 21:07)  HR: 60 (16 Dec 2021 05:04) (60 - 60)  BP: 138/84 (16 Dec 2021 05:04) (120/70 - 138/84)  BP(mean): --  RR: 17 (16 Dec 2021 05:04) (17 - 17)  SpO2: 100% (16 Dec 2021 05:04) (100% - 100%)    LABS:

## 2021-12-16 NOTE — PROGRESS NOTE ADULT - PROBLEM SELECTOR PLAN 9
DVT ppx: Continue home Xarelto  Diet: CC/DASH diet  Dispo: Home with outpatient PT - Continue home gabapentin

## 2021-12-16 NOTE — CONSULT NOTE ADULT - ASSESSMENT
85yo female with hx kyphoplasty at T9 3 weeks ago at [facility], now with T10 endplate fx     PLAN:   - ordered a tlso brace for patient to wear when OOB, will be delivered tomorrow  - outpatient f/u with dr. braxton     Case discussed with attending neurosurgeon

## 2021-12-16 NOTE — PROGRESS NOTE ADULT - PROBLEM SELECTOR PLAN 2
- Continue home losartan, metoprolol Patient with urine growing >100k pseudomonas  Denying any urinary symptoms  - No indication for treatment at this time

## 2021-12-16 NOTE — PROGRESS NOTE ADULT - SUBJECTIVE AND OBJECTIVE BOX
PROGRESS NOTE:   Authored by Jairo Villalobos MD  Pager: [facility] [phone]; [facility] [phone]    Patient is a 86y old  Female who presents with a chief complaint of Back pain (15 Dec 2021 11:05)      SUBJECTIVE / OVERNIGHT EVENTS:    ADDITIONAL REVIEW OF SYSTEMS:    MEDICATIONS  (STANDING):  acetaminophen     Tablet .. 650 milliGRAM(s) Oral every 6 hours  atorvastatin 40 milliGRAM(s) Oral at bedtime  clopidogrel Tablet 75 milliGRAM(s) Oral daily  dextrose 40% Gel 15 Gram(s) Oral once  dextrose 5%. 1000 milliLiter(s) (50 mL/Hr) IV Continuous <Continuous>  dextrose 5%. 1000 milliLiter(s) (100 mL/Hr) IV Continuous <Continuous>  dextrose 50% Injectable 25 Gram(s) IV Push once  dextrose 50% Injectable 12.5 Gram(s) IV Push once  dextrose 50% Injectable 25 Gram(s) IV Push once  gabapentin 100 milliGRAM(s) Oral three times a day  glucagon  Injectable 1 milliGRAM(s) IntraMuscular once  insulin lispro (ADMELOG) corrective regimen sliding scale   SubCutaneous three times a day before meals  insulin lispro (ADMELOG) corrective regimen sliding scale   SubCutaneous at bedtime  levothyroxine 75 MICROGram(s) Oral daily  lidocaine   4% Patch 1 Patch Transdermal daily  losartan 100 milliGRAM(s) Oral daily  metoprolol tartrate 12.5 milliGRAM(s) Oral daily  pantoprazole    Tablet 40 milliGRAM(s) Oral before breakfast  predniSONE   Tablet 5 milliGRAM(s) Oral daily  rivaroxaban 20 milliGRAM(s) Oral daily    MEDICATIONS  (PRN):  aluminum hydroxide/magnesium hydroxide/simethicone Suspension 30 milliLiter(s) Oral every 4 hours PRN Dyspepsia  melatonin 3 milliGRAM(s) Oral at bedtime PRN Insomnia  ondansetron Injectable 4 milliGRAM(s) IV Push every 8 hours PRN Nausea and/or Vomiting  oxyCODONE    IR 5 milliGRAM(s) Oral two times a day PRN Severe Pain (7 - 10)      CAPILLARY BLOOD GLUCOSE      POCT Blood Glucose.: 246 mg/dL (15 Dec 2021 21:31)  POCT Blood Glucose.: 238 mg/dL (15 Dec 2021 17:44)  POCT Blood Glucose.: 286 mg/dL (15 Dec 2021 12:21)  POCT Blood Glucose.: 202 mg/dL (15 Dec 2021 08:42)    I&O's Summary      PHYSICAL EXAM:  Vital Signs Last 24 Hrs  T(C): 36.7 (16 Dec 2021 05:04), Max: 36.7 (15 Dec 2021 21:07)  T(F): 98 (16 Dec 2021 05:04), Max: 98.1 (15 Dec 2021 21:07)  HR: 60 (16 Dec 2021 05:04) (60 - 60)  BP: 138/84 (16 Dec 2021 05:04) (120/70 - 138/84)  BP(mean): --  RR: 17 (16 Dec 2021 05:04) (17 - 18)  SpO2: 100% (16 Dec 2021 05:04) (98% - 100%)    GENERAL: No acute distress, well-developed  HEAD:  Atraumatic, Normocephalic  EYES: EOMI, PERRLA, conjunctiva and sclera clear  NECK: Supple, no lymphadenopathy, no JVD  CHEST/LUNG: CTAB; No wheezes, rales, or rhonchi  HEART: Regular rate and rhythm; No murmurs, rubs, or gallops  ABDOMEN: Soft, non-tender, non-distended; normal bowel sounds, no organomegaly  EXTREMITIES:  2+ peripheral pulses b/l, No clubbing, cyanosis, or edema  NEUROLOGY: A&O x 3, no focal deficits  SKIN: No rashes or lesions    LABS:                    Culture - Urine (collected 14 Dec 2021 02:11)  Source: Clean Catch Clean Catch (Midstream)  Preliminary Report (15 Dec 2021 15:22):    >100,000 CFU/ml Pseudomonas aeruginosa    <10,000 CFU/ml Normal Urogenital shayne present        RADIOLOGY & ADDITIONAL TESTS:  Results Reviewed:   Imaging Personally Reviewed:  Electrocardiogram Personally Reviewed:    COORDINATION OF CARE:  Care Discussed with Consultants/Other Providers [Y/N]:  Prior or Outpatient Records Reviewed [Y/N]:   PROGRESS NOTE:   Authored by Jairo Villalobos MD  Pager: [facility] [phone]; [facility] [phone]    Patient is a 86y old  Female who presents with a chief complaint of Back pain (15 Dec 2021 11:05)      SUBJECTIVE / OVERNIGHT EVENTS:  Overnight no acute events. Went for MRI yesterday, got oxycodone 5mg x1 at noon yesterday.  This morning reports improved back pain. Continues to deny urinary symptoms including pain, urgency, incontinence, frequency.    ADDITIONAL REVIEW OF SYSTEMS:  CONSTITUTIONAL: No fevers or chills  RESPIRATORY: No cough, wheezing, hemoptysis; No shortness of breath  CARDIOVASCULAR: No chest pain or palpitations  GASTROINTESTINAL: No abdominal or epigastric pain. No nausea, vomiting, or hematemesis; No diarrhea or constipation. No melena or hematochezia.  GENITOURINARY: No dysuria, frequency or hematuria  NEUROLOGICAL: No numbness or weakness  All other review of systems is negative unless indicated above.    MEDICATIONS  (STANDING):  acetaminophen     Tablet .. 650 milliGRAM(s) Oral every 6 hours  atorvastatin 40 milliGRAM(s) Oral at bedtime  clopidogrel Tablet 75 milliGRAM(s) Oral daily  dextrose 40% Gel 15 Gram(s) Oral once  dextrose 5%. 1000 milliLiter(s) (50 mL/Hr) IV Continuous <Continuous>  dextrose 5%. 1000 milliLiter(s) (100 mL/Hr) IV Continuous <Continuous>  dextrose 50% Injectable 25 Gram(s) IV Push once  dextrose 50% Injectable 12.5 Gram(s) IV Push once  dextrose 50% Injectable 25 Gram(s) IV Push once  gabapentin 100 milliGRAM(s) Oral three times a day  glucagon  Injectable 1 milliGRAM(s) IntraMuscular once  insulin lispro (ADMELOG) corrective regimen sliding scale   SubCutaneous three times a day before meals  insulin lispro (ADMELOG) corrective regimen sliding scale   SubCutaneous at bedtime  levothyroxine 75 MICROGram(s) Oral daily  lidocaine   4% Patch 1 Patch Transdermal daily  losartan 100 milliGRAM(s) Oral daily  metoprolol tartrate 12.5 milliGRAM(s) Oral daily  pantoprazole    Tablet 40 milliGRAM(s) Oral before breakfast  predniSONE   Tablet 5 milliGRAM(s) Oral daily  rivaroxaban 20 milliGRAM(s) Oral daily    MEDICATIONS  (PRN):  aluminum hydroxide/magnesium hydroxide/simethicone Suspension 30 milliLiter(s) Oral every 4 hours PRN Dyspepsia  melatonin 3 milliGRAM(s) Oral at bedtime PRN Insomnia  ondansetron Injectable 4 milliGRAM(s) IV Push every 8 hours PRN Nausea and/or Vomiting  oxyCODONE    IR 5 milliGRAM(s) Oral two times a day PRN Severe Pain (7 - 10)      CAPILLARY BLOOD GLUCOSE      POCT Blood Glucose.: 246 mg/dL (15 Dec 2021 21:31)  POCT Blood Glucose.: 238 mg/dL (15 Dec 2021 17:44)  POCT Blood Glucose.: 286 mg/dL (15 Dec 2021 12:21)  POCT Blood Glucose.: 202 mg/dL (15 Dec 2021 08:42)    I&O's Summary      PHYSICAL EXAM:  Vital Signs Last 24 Hrs  T(C): 36.7 (16 Dec 2021 05:04), Max: 36.7 (15 Dec 2021 21:07)  T(F): 98 (16 Dec 2021 05:04), Max: 98.1 (15 Dec 2021 21:07)  HR: 60 (16 Dec 2021 05:04) (60 - 60)  BP: 138/84 (16 Dec 2021 05:04) (120/70 - 138/84)  BP(mean): --  RR: 17 (16 Dec 2021 05:04) (17 - 18)  SpO2: 100% (16 Dec 2021 05:04) (98% - 100%)    GENERAL: No acute distress, well-developed  HEAD:  Atraumatic, Normocephalic  CHEST/LUNG: CTAB; No wheezes, rales, or rhonchi  HEART: Regular rate and rhythm; Systolic murmur noted  ABDOMEN: Soft, non-tender, non-distended; normal bowel sounds  EXTREMITIES:  2+ peripheral pulses b/l, No clubbing, cyanosis, or edema  NEUROLOGY: A&O x 3, no focal deficits    LABS:    Culture - Urine (collected 14 Dec 2021 02:11)  Source: Clean Catch Clean Catch (Midstream)  Preliminary Report (15 Dec 2021 15:22):    >100,000 CFU/ml Pseudomonas aeruginosa    <10,000 CFU/ml Normal Urogenital shayne present        RADIOLOGY & ADDITIONAL TESTS:    MRI completed 12/15- pending read PROGRESS NOTE:   Authored by Jairo Villalobos MD  Pager: [facility] [phone]; [facility] [phone]    Patient is a 86y old  Female who presents with a chief complaint of Back pain (15 Dec 2021 11:05)      SUBJECTIVE / OVERNIGHT EVENTS:  Overnight no acute events. Went for MRI yesterday, got oxycodone 5mg x1 at noon yesterday.  This morning reports improved back pain. Continues to deny urinary symptoms including pain, urgency, incontinence, frequency.    ADDITIONAL REVIEW OF SYSTEMS:  CONSTITUTIONAL: No fevers or chills  RESPIRATORY: No cough, wheezing, hemoptysis; No shortness of breath  CARDIOVASCULAR: No chest pain or palpitations  GASTROINTESTINAL: No abdominal or epigastric pain. No nausea, vomiting, or hematemesis; No diarrhea or constipation. No melena or hematochezia.  GENITOURINARY: No dysuria, frequency or hematuria  NEUROLOGICAL: No numbness or weakness  All other review of systems is negative unless indicated above.    MEDICATIONS  (STANDING):  acetaminophen     Tablet .. 650 milliGRAM(s) Oral every 6 hours  atorvastatin 40 milliGRAM(s) Oral at bedtime  clopidogrel Tablet 75 milliGRAM(s) Oral daily  dextrose 40% Gel 15 Gram(s) Oral once  dextrose 5%. 1000 milliLiter(s) (50 mL/Hr) IV Continuous <Continuous>  dextrose 5%. 1000 milliLiter(s) (100 mL/Hr) IV Continuous <Continuous>  dextrose 50% Injectable 25 Gram(s) IV Push once  dextrose 50% Injectable 12.5 Gram(s) IV Push once  dextrose 50% Injectable 25 Gram(s) IV Push once  gabapentin 100 milliGRAM(s) Oral three times a day  glucagon  Injectable 1 milliGRAM(s) IntraMuscular once  insulin lispro (ADMELOG) corrective regimen sliding scale   SubCutaneous three times a day before meals  insulin lispro (ADMELOG) corrective regimen sliding scale   SubCutaneous at bedtime  levothyroxine 75 MICROGram(s) Oral daily  lidocaine   4% Patch 1 Patch Transdermal daily  losartan 100 milliGRAM(s) Oral daily  metoprolol tartrate 12.5 milliGRAM(s) Oral daily  pantoprazole    Tablet 40 milliGRAM(s) Oral before breakfast  predniSONE   Tablet 5 milliGRAM(s) Oral daily  rivaroxaban 20 milliGRAM(s) Oral daily    MEDICATIONS  (PRN):  aluminum hydroxide/magnesium hydroxide/simethicone Suspension 30 milliLiter(s) Oral every 4 hours PRN Dyspepsia  melatonin 3 milliGRAM(s) Oral at bedtime PRN Insomnia  ondansetron Injectable 4 milliGRAM(s) IV Push every 8 hours PRN Nausea and/or Vomiting  oxyCODONE    IR 5 milliGRAM(s) Oral two times a day PRN Severe Pain (7 - 10)      CAPILLARY BLOOD GLUCOSE      POCT Blood Glucose.: 246 mg/dL (15 Dec 2021 21:31)  POCT Blood Glucose.: 238 mg/dL (15 Dec 2021 17:44)  POCT Blood Glucose.: 286 mg/dL (15 Dec 2021 12:21)  POCT Blood Glucose.: 202 mg/dL (15 Dec 2021 08:42)    I&O's Summary      PHYSICAL EXAM:  Vital Signs Last 24 Hrs  T(C): 36.7 (16 Dec 2021 05:04), Max: 36.7 (15 Dec 2021 21:07)  T(F): 98 (16 Dec 2021 05:04), Max: 98.1 (15 Dec 2021 21:07)  HR: 60 (16 Dec 2021 05:04) (60 - 60)  BP: 138/84 (16 Dec 2021 05:04) (120/70 - 138/84)  BP(mean): --  RR: 17 (16 Dec 2021 05:04) (17 - 18)  SpO2: 100% (16 Dec 2021 05:04) (98% - 100%)    GENERAL: No acute distress, well-developed  HEAD:  Atraumatic, Normocephalic  CHEST/LUNG: CTAB; No wheezes, rales, or rhonchi  HEART: Regular rate and rhythm; Systolic murmur noted  ABDOMEN: Soft, non-tender, non-distended; normal bowel sounds  EXTREMITIES:  2+ peripheral pulses b/l, No clubbing, cyanosis, or edema  NEUROLOGY: A&O x 3, no focal deficits    LABS:    Culture - Urine (collected 14 Dec 2021 02:11)  Source: Clean Catch Clean Catch (Midstream)  Preliminary Report (15 Dec 2021 15:22):    >100,000 CFU/ml Pseudomonas aeruginosa    <10,000 CFU/ml Normal Urogenital shayne present        RADIOLOGY & ADDITIONAL TESTS:    EXAM:  MR SPINE THORACIC                        EXAM:  MR SPINE LUMBAR      PROCEDURE DATE:  12/15/2021      IMPRESSION:  THORACIC SPINE MRI:    Acute mild compression fracture of the superior endplate of T10. No   retropulsed bone.    Similar chronic moderate compression fracture of T9 which has been   treated with methylmethacrylate. No retropulsed bone.    No spinal cord compression or abnormal intrinsic cord signal.    Mild multilevel degenerative changes.    LUMBAR SPINE MRI:    Multilevel degenerative changes as detailed in the body the report.    Dr. Heath discussed these findings with on 12/16/2021 9:55 AM with read   back.

## 2021-12-27 PROBLEM — Z00.00 ENCOUNTER FOR PREVENTIVE HEALTH EXAMINATION: Status: ACTIVE | Noted: 2021-12-27

## 2022-01-27 ENCOUNTER — APPOINTMENT (OUTPATIENT)
Dept: NEUROSURGERY | Facility: CLINIC | Age: 87
End: 2022-01-27

## 2024-05-31 NOTE — PROGRESS NOTE ADULT - PROBLEM SELECTOR PROBLEM 2
REMINDER: An FMLA/Disability form was recently sent to your office for review and signature.   Please complete, sign and fax to [phone]  as soon as possible.    Thank you,  Forms Completion Team.    HTN (hypertension)